# Patient Record
Sex: MALE | Employment: OTHER | URBAN - METROPOLITAN AREA
[De-identification: names, ages, dates, MRNs, and addresses within clinical notes are randomized per-mention and may not be internally consistent; named-entity substitution may affect disease eponyms.]

---

## 2017-01-18 ENCOUNTER — HOSPITAL ENCOUNTER (OUTPATIENT)
Dept: CT IMAGING | Age: 72
Discharge: HOME | End: 2017-01-18
Payer: SELF-PAY

## 2017-01-18 DIAGNOSIS — F17.200 SMOKER: ICD-10-CM

## 2017-01-18 PROCEDURE — G0297 LDCT FOR LUNG CA SCREEN: HCPCS

## 2018-02-27 ENCOUNTER — HOSPITAL ENCOUNTER (OUTPATIENT)
Dept: CT IMAGING | Age: 73
Discharge: HOME | End: 2018-02-27
Payer: SELF-PAY

## 2018-02-27 VITALS — HEIGHT: 71 IN | WEIGHT: 190 LBS | BODY MASS INDEX: 26.6 KG/M2

## 2018-02-27 DIAGNOSIS — Z87.891 PERSONAL HISTORY OF NICOTINE DEPENDENCE: ICD-10-CM

## 2018-02-27 PROCEDURE — G0297 LDCT FOR LUNG CA SCREEN: HCPCS

## 2018-04-24 ENCOUNTER — OFFICE VISIT (OUTPATIENT)
Dept: SURGERY | Age: 73
End: 2018-04-24

## 2018-04-24 VITALS
DIASTOLIC BLOOD PRESSURE: 82 MMHG | BODY MASS INDEX: 27.26 KG/M2 | SYSTOLIC BLOOD PRESSURE: 151 MMHG | HEIGHT: 71 IN | WEIGHT: 194.7 LBS | HEART RATE: 87 BPM

## 2018-04-24 DIAGNOSIS — Z86.010 HX OF COLONIC POLYP: Primary | ICD-10-CM

## 2018-04-24 RX ORDER — ATORVASTATIN CALCIUM 10 MG/1
10 TABLET, FILM COATED ORAL DAILY
COMMUNITY
Start: 2018-02-28

## 2018-04-24 RX ORDER — BISACODYL 5 MG
TABLET, DELAYED RELEASE (ENTERIC COATED) ORAL
Qty: 1 TAB | Refills: 0 | Status: SHIPPED | OUTPATIENT
Start: 2018-04-24 | End: 2018-05-24 | Stop reason: ALTCHOICE

## 2018-04-24 RX ORDER — VALSARTAN 160 MG/1
160 TABLET ORAL DAILY
COMMUNITY
Start: 2018-02-28 | End: 2020-02-10

## 2018-04-24 RX ORDER — SODIUM CHLORIDE, SODIUM LACTATE, POTASSIUM CHLORIDE, CALCIUM CHLORIDE 600; 310; 30; 20 MG/100ML; MG/100ML; MG/100ML; MG/100ML
200 INJECTION, SOLUTION INTRAVENOUS CONTINUOUS
Status: CANCELLED | OUTPATIENT
Start: 2018-04-24 | End: 2018-04-25

## 2018-04-24 RX ORDER — METOPROLOL SUCCINATE 200 MG/1
200 TABLET, EXTENDED RELEASE ORAL DAILY
COMMUNITY
Start: 2018-03-26

## 2018-04-24 RX ORDER — POLYETHYLENE GLYCOL 3350, SODIUM CHLORIDE, SODIUM BICARBONATE, POTASSIUM CHLORIDE 420; 11.2; 5.72; 1.48 G/4L; G/4L; G/4L; G/4L
POWDER, FOR SOLUTION ORAL
Qty: 1 BOTTLE | Refills: 0 | Status: SHIPPED | OUTPATIENT
Start: 2018-04-24 | End: 2018-05-24 | Stop reason: ALTCHOICE

## 2018-04-24 RX ORDER — ALLOPURINOL 300 MG/1
300 TABLET ORAL DAILY
COMMUNITY
Start: 2018-02-28

## 2018-04-24 NOTE — PROGRESS NOTES
[address] [facility], [facility]  Phone: [phone] Fax: [phone]                                              HISTORY AND PHYSICAL EXAM    NAME: Park Arthur  :     Chief Complaint:   History of colon cancer    History: Park Arthur is a 67 y.o.  male referred for 5 year colonoscopy. The patient has had multiple colonoscopies. The previous one was normal but one in the past showed polyps. The last colonoscopy was in . The bowel movements are regular and brown. He does not use any meds on a regular basis for his bowels. He denies any blood in stools or hemorrhoidal disease. Family history is negative for colon cancer or colon polyps. Past Medical History:   Diagnosis Date    Calculus of kidney     Hypercholesterolemia     Hypertension      Past Surgical History:   Procedure Laterality Date    HX COLONOSCOPY      HX HERNIA REPAIR          Current Outpatient Prescriptions   Medication Sig Dispense Refill    allopurinol (ZYLOPRIM) 300 mg tablet Take 300 mg by mouth daily.  atorvastatin (LIPITOR) 10 mg tablet Take 10 mg by mouth daily.  metoprolol succinate (TOPROL-XL) 50 mg XL tablet Take 50 mg by mouth daily.  valsartan (DIOVAN) 160 mg tablet Take 160 mg by mouth daily.  peg-electrolyte soln (GAVILYTE-N) 420 gram solution Take as directed  Indications: BOWEL EVACUATION 1 Bottle 0    bisacodyl (DULCOLAX, BISACODYL,) 5 mg EC tablet Take as directed  Indications: BOWEL EVACUATION 1 Tab 0     No Known Allergies  Social History     Social History    Marital status:      Spouse name: N/A    Number of children: N/A    Years of education: N/A     Occupational History    Not on file.      Social History Main Topics    Smoking status: Former Smoker     Packs/day: 1.00     Years: 30.00     Types: Cigarettes     Start date: 1972     Quit date: 2007    Smokeless tobacco: Never Used    Alcohol use Yes    Drug use: Not on file    Sexual activity: Not on file     Other Topics Concern    Not on file     Social History Narrative     Family History   Problem Relation Age of Onset    No Known Problems Mother        There is no problem list on file for this patient. Review of Systems:  Review of Systems   Constitutional: Negative for chills, fever, malaise/fatigue and weight loss. HENT: Negative for congestion, ear discharge, ear pain, hearing loss, nosebleeds, sore throat and tinnitus. Eyes: Negative for blurred vision, double vision, pain, discharge and redness. Respiratory: Negative for cough, sputum production, shortness of breath and wheezing. Cardiovascular: Negative for chest pain, palpitations and leg swelling. Gastrointestinal: Negative for abdominal pain, blood in stool, constipation, diarrhea, heartburn, melena, nausea and vomiting. Genitourinary: Positive for frequency. Negative for hematuria and urgency. Musculoskeletal: Negative for back pain, joint pain and neck pain. Skin: Negative for itching and rash. Neurological: Negative for dizziness, tremors, focal weakness, seizures, weakness and headaches. Endo/Heme/Allergies: Does not bruise/bleed easily. Psychiatric/Behavioral: Negative for depression and memory loss. The patient is not nervous/anxious and does not have insomnia. Physical Exam:  Visit Vitals    /82 (BP 1 Location: Left arm, BP Patient Position: Sitting)    Pulse 87    Ht 5' 11\" (1.803 m)    Wt 194 lb 11.2 oz (88.3 kg)    BMI 27.16 kg/m2       Physical Exam   Constitutional: He is oriented to person, place, and time. He appears well-developed and well-nourished. No distress. HENT:   Head: Normocephalic and atraumatic. Right Ear: External ear normal.   Left Ear: External ear normal.   Nose: Nose normal.   Mouth/Throat: Oropharynx is clear and moist. No oropharyngeal exudate. Eyes: EOM are normal. Pupils are equal, round, and reactive to light.  Right eye exhibits no discharge. Left eye exhibits no discharge. No scleral icterus. Neck: No tracheal deviation present. No thyromegaly present. Cardiovascular: Normal rate, regular rhythm and normal heart sounds. Exam reveals no friction rub. No murmur heard. Pulmonary/Chest: Effort normal and breath sounds normal. He has no wheezes. He has no rales. Abdominal: Soft. He exhibits no distension and no mass. There is no tenderness. Musculoskeletal: Normal range of motion. Lymphadenopathy:     He has no cervical adenopathy. Neurological: He is alert and oriented to person, place, and time. Skin: Skin is warm and dry. No rash noted. No erythema. Psychiatric: He has a normal mood and affect. His behavior is normal. Judgment and thought content normal.       Impression:  History of polyps    Plan:  Colonoscopy on 8/04/32  Risks and complications were explained to patient and they voiced understanding.     Olya Bhatti M.D.

## 2018-05-16 PROBLEM — K57.30 DIVERTICULA OF COLON: Status: ACTIVE | Noted: 2018-05-16

## 2018-05-16 PROBLEM — K63.5 COLON POLYP: Status: RESOLVED | Noted: 2018-05-16 | Resolved: 2018-05-16

## 2018-05-16 PROBLEM — K63.5 COLON POLYP: Status: ACTIVE | Noted: 2018-05-16

## 2018-05-16 LAB — COLONOSCOPY, EXTERNAL: NORMAL

## 2018-05-24 ENCOUNTER — OFFICE VISIT (OUTPATIENT)
Dept: SURGERY | Age: 73
End: 2018-05-24

## 2018-05-24 VITALS
WEIGHT: 194 LBS | DIASTOLIC BLOOD PRESSURE: 79 MMHG | BODY MASS INDEX: 27.16 KG/M2 | SYSTOLIC BLOOD PRESSURE: 136 MMHG | HEIGHT: 71 IN | HEART RATE: 65 BPM

## 2018-05-24 DIAGNOSIS — K57.30 DIVERTICULA OF COLON: ICD-10-CM

## 2018-05-24 DIAGNOSIS — D12.4 ADENOMATOUS POLYP OF DESCENDING COLON: Primary | ICD-10-CM

## 2018-05-24 NOTE — PROGRESS NOTES
Follow up from colonoscopy  No c/o      Pathology:  Adenomatous polyp x 1    Visit Vitals    /79 (BP 1 Location: Left arm, BP Patient Position: Sitting)    Pulse 65    Ht 5' 11\" (1.803 m)    Wt 194 lb (88 kg)    BMI 27.06 kg/m2     WDWN patient in no acute distress. Pathophysiology and malignancy potential discussed. Pictures viewed and questions answered. High fiber diet suggested. Time spent 15 minutes in discussion, over half the visit was in care coordination and patient counseling. Repeat scope in 5 years.

## 2020-02-10 ENCOUNTER — OFFICE VISIT (OUTPATIENT)
Dept: CARDIOLOGY CLINIC | Age: 75
End: 2020-02-10

## 2020-02-10 VITALS
HEIGHT: 71 IN | WEIGHT: 191 LBS | HEART RATE: 130 BPM | RESPIRATION RATE: 14 BRPM | OXYGEN SATURATION: 99 % | SYSTOLIC BLOOD PRESSURE: 142 MMHG | BODY MASS INDEX: 26.74 KG/M2 | DIASTOLIC BLOOD PRESSURE: 98 MMHG

## 2020-02-10 DIAGNOSIS — E11.9 CONTROLLED TYPE 2 DIABETES MELLITUS WITHOUT COMPLICATION, WITHOUT LONG-TERM CURRENT USE OF INSULIN (HCC): ICD-10-CM

## 2020-02-10 DIAGNOSIS — E78.00 HYPERCHOLESTEROLEMIA: ICD-10-CM

## 2020-02-10 DIAGNOSIS — R00.0 TACHYCARDIA: ICD-10-CM

## 2020-02-10 DIAGNOSIS — I48.0 PAF (PAROXYSMAL ATRIAL FIBRILLATION) (HCC): Primary | ICD-10-CM

## 2020-02-10 DIAGNOSIS — I10 ESSENTIAL HYPERTENSION: ICD-10-CM

## 2020-02-10 PROBLEM — K57.30 DIVERTICULA OF COLON: Status: RESOLVED | Noted: 2018-05-16 | Resolved: 2020-02-10

## 2020-02-10 RX ORDER — DILTIAZEM HYDROCHLORIDE 180 MG/1
180 CAPSULE, COATED, EXTENDED RELEASE ORAL DAILY
Qty: 90 CAP | Refills: 1 | Status: SHIPPED | OUTPATIENT
Start: 2020-02-10

## 2020-02-10 NOTE — PROGRESS NOTES
Verified patient with two patient identifiers. Medications reviewed/approved by Dr. Pepper Aguilar. 1. Have you been to the ER, urgent care clinic since your last visit? Hospitalized since your last visit? new pt    2. Have you seen or consulted any other health care providers outside of the [address] [place] [address] since your last visit? Include any pap smears or colon screening.  New pt

## 2020-02-10 NOTE — LETTER
2/10/20 Patient: Rena Steward YOB: 1945 Date of Visit: 2/10/2020 Jessica March MD 
[address] [facility] [place] [id number] VIA Facsimile: [phone] Dear Jessica March MD, Thank you for referring Mr. Marixa Fox to [facility] for evaluation. My notes for this consultation are attached. If you have questions, please do not hesitate to call me. I look forward to following your patient along with you.  
 
 
Sincerely, 
 
Eunice Lange MD

## 2020-02-10 NOTE — PROGRESS NOTES
Nadiya Prather is a 76 y.o. male is here for cardiac evaluation, referred by Dr. Tom Kapadia. Episodes of tachy since December--minimal to no sx but notices when checks pulse/BP. To ER in [facility] tachy, seen in f/u by Dr. Serjio Grimes with episodes afib/flutter, tachy and now on Metoprolol /d, started on Eliquis. Is in afib today, --no sx currently and unaware. Hx hypertension, dyslipidemia, DM II (diet). Former tobacco--quit 20 yrs ago, mod ETOH. No hx CAD, CHF, valvular, rheumatic, other. The patient denies chest pain/ shortness of breath, orthopnea, PND, LE edema, syncope, presyncope or fatigue.        Patient Active Problem List    Diagnosis Date Noted    Controlled type 2 diabetes mellitus without complication, without long-term current use of insulin ([facility] [address] 02/10/2020    PAF (paroxysmal atrial fibrillation) ([facility] [address]     Hypertension     Hypercholesterolemia       Army Silvestre MD  Past Medical History:   Diagnosis Date    Calculus of kidney     Hypercholesterolemia     Hypertension     PAF (paroxysmal atrial fibrillation) (HCC)       Past Surgical History:   Procedure Laterality Date    COLONOSCOPY N/A 2018    COLONOSCOPY performed by Duane Thurman MD at [facility] [address]27 HX COLONOSCOPY      HX COLONOSCOPY  2018    polyp, repeat in     HX HERNIA REPAIR       Allergies   Allergen Reactions    Codeine Rash      Family History   Problem Relation Age of Onset    Ovarian Cancer Mother     Heart Disease Father         CAD, s/p valve,  80    Liver Disease Father       Social History     Socioeconomic History    Marital status:      Spouse name: Not on file    Number of children: Not on file    Years of education: Not on file    Highest education level: Not on file   Occupational History    Not on file   Social Needs    Financial resource strain: Not on file    Food insecurity:     Worry: Not on file Inability: Not on file    Transportation needs:     Medical: Not on file     Non-medical: Not on file   Tobacco Use    Smoking status: Former Smoker     Packs/day: 1.00     Years: 30.00     Pack years: 30.00     Types: Cigarettes     Start date: 1972     Last attempt to quit: 2007     Years since quittin.9    Smokeless tobacco: Never Used   Substance and Sexual Activity    Alcohol use: Yes    Drug use: Not on file    Sexual activity: Not on file   Lifestyle    Physical activity:     Days per week: Not on file     Minutes per session: Not on file    Stress: Not on file   Relationships    Social connections:     Talks on phone: Not on file     Gets together: Not on file     Attends Pentecostal service: Not on file     Active member of club or organization: Not on file     Attends meetings of clubs or organizations: Not on file     Relationship status: Not on file    Intimate partner violence:     Fear of current or ex partner: Not on file     Emotionally abused: Not on file     Physically abused: Not on file     Forced sexual activity: Not on file   Other Topics Concern    Not on file   Social History Narrative    Not on file      Current Outpatient Medications   Medication Sig    apixaban (ELIQUIS) 5 mg tablet Take 5 mg by mouth two (2) times a day.  dilTIAZem CD (CARDIZEM CD) 180 mg ER capsule Take 1 Cap by mouth daily.  losartan (COZAAR) 100 mg tablet Take 100 mg by mouth two (2) times a day.  aspirin-calcium carbonate 81 mg-300 mg calcium(777 mg) tab Take 81 mg by mouth.  allopurinol (ZYLOPRIM) 300 mg tablet Take 300 mg by mouth daily.  atorvastatin (LIPITOR) 10 mg tablet Take 10 mg by mouth daily.  metoprolol succinate (TOPROL-XL) 200 mg XL tablet Take 200 mg by mouth daily. No current facility-administered medications for this visit. Review of Symptoms:    CONST  No weight change.  No fever, chills, sweats    ENT No visual changes, URI sx, sore throat    CV See HPI   RESP  No cough, or sputum, wheezing. Also see HPI   GI  No abdominal pain or change in bowel habits. No heartburn or dysphagia. No melena or rectal bleeding.   No dysuria, urgency, frequency, hematuria   MSKEL  No joint pain, swelling. No muscle pain. SKIN  No rash or lesions. NEURO  No headache, syncope, or seizure. No weakness, loss of sensation, or paresthesias. PSYCH  No low mood or depression  No anxiety. HE/LYMPH  No easy bruising, abnormal bleeding, or enlarged glands. Physical ExamPhysical Exam:    Visit Vitals  BP (!) 142/98 (BP 1 Location: Left arm, BP Patient Position: Sitting)   Pulse (!) 130   Resp 14   Ht 5' 11\" (1.803 m)   Wt 191 lb (86.6 kg)   SpO2 99% Comment: ra   BMI 26.64 kg/m²     Gen: NAD  HEENT:  PERRL, throat clear  Neck: no adenopathy, no thyromegaly, no JVD   Heart:  irregular,Nl S1S2,  no murmur, gallop or rub.   Lungs:  clear  Abdomen:   Soft, non-tender, bowel sounds are active.   Extremities:  No edema  Pulse: symmetric  Neuro: A&O times 3, No focal neuro deficits    Cardiographics    ECG: afib, , RBBB    Labs:   Lab Results   Component Value Date/Time    Sodium 140 12/08/2019 11:02 AM    Potassium 4.1 12/08/2019 11:02 AM    Chloride 103 12/08/2019 11:02 AM    CO2 27 12/08/2019 11:02 AM    Anion gap 10 12/08/2019 11:02 AM    Glucose 127 (H) 12/08/2019 11:02 AM    BUN 20 12/08/2019 11:02 AM    Creatinine 1.09 12/08/2019 11:02 AM    BUN/Creatinine ratio 18 12/08/2019 11:02 AM    GFR est AA >60 12/08/2019 11:02 AM    GFR est non-AA >60 12/08/2019 11:02 AM    Calcium 10.2 (H) 12/08/2019 11:02 AM    Bilirubin, total 0.8 12/08/2019 11:02 AM    AST (SGOT) 33 12/08/2019 11:02 AM    Alk.  phosphatase 59 12/08/2019 11:02 AM    Protein, total 7.3 12/08/2019 11:02 AM    Albumin 4.2 12/08/2019 11:02 AM    Globulin 3.1 12/08/2019 11:02 AM    A-G Ratio 1.4 12/08/2019 11:02 AM    ALT (SGPT) 45 12/08/2019 11:02 AM     No results found for: CPK, CPKX, CPX  No results found for: CHOL, CHOLX, CHLST, CHOLV, 402761, HDL, HDLP, LDL, LDLC, DLDLP, TGLX, TRIGL, TRIGP, CHHD, CHHDX  No results found for this or any previous visit. Assessment:         Patient Active Problem List    Diagnosis Date Noted    Controlled type 2 diabetes mellitus without complication, without long-term current use of insulin ([facility] [address] [facility] 02/10/2020    PAF (paroxysmal atrial fibrillation) ([facility]     Hypertension     Hypercholesterolemia      76 y.o. male is here for cardiac evaluation, referred by Dr. Darinel Mcnally. Episodes of tachy since December--minimal to no sx but notices when checks pulse/BP. To ER in [facility] tachy, seen in f/u by Dr. Carol Ann Ireland with episodes afib/flutter, tachy and now on Metoprolol /d, started on Eliquis. Is in afib today, --no sx currently and unaware. Hx hypertension, dyslipidemia, DM II (diet). Former tobacco--quit 20 yrs ago, mod ETOH.  No hx CAD, CHF, valvular, rheumatic, other     Plan:     ADD Cardizem   Continue Metoprolol  + Eliquis (CHADs2-VASC 3)  Echo/doppler  Lexiscan MPI--stay on meds  Discussed afib at length with pt, treatment options (incl ablation, DCC/AAD, rate control/anticoag)--minimal to no sx currently  Outside notes and Holter reviewed, will obtain recent labs from PCP    Coleman Levi MD

## 2020-02-10 NOTE — PATIENT INSTRUCTIONS
Please  the Cardizem as soon as you can and take ONE capsule. Starting tomorrow take metoprolol in the morning and Cardizem in the evening.

## 2020-02-14 ENCOUNTER — TELEPHONE (OUTPATIENT)
Dept: CARDIOLOGY CLINIC | Age: 75
End: 2020-02-14

## 2020-02-14 NOTE — TELEPHONE ENCOUNTER
----- Message from Sabina Zamarripa MD sent at 2/11/2020  2:03 PM EST -----  Regarding: Gavino Lynch MPI  Call/advise stress nuclear scan looks normal, no blockages or ischemia, normal LV pumping function. Echo pending. Thanks [employer] [place]    Spoke with the patient. Verified patient with two patient identifiers. Results given and questions answered. Patient verbalized understanding.

## 2020-02-26 ENCOUNTER — TELEPHONE (OUTPATIENT)
Dept: CARDIOLOGY CLINIC | Age: 75
End: 2020-02-26

## 2020-02-26 NOTE — TELEPHONE ENCOUNTER
Per Dr. Jeremías Landry: Advise echo shows normal LV pumping function, only mild valve abnormalities--no concerns. F/u 6 mo.  [facility]     Spoke with the patient. Verified patient with two patient identifiers. Results given and questions answered. Patient verbalized understanding. Pt wants to call back regarding follow up.

## 2020-06-17 ENCOUNTER — OFFICE VISIT (OUTPATIENT)
Dept: SURGERY | Age: 75
End: 2020-06-17

## 2020-06-17 VITALS
SYSTOLIC BLOOD PRESSURE: 149 MMHG | WEIGHT: 190 LBS | TEMPERATURE: 98.9 F | HEIGHT: 71 IN | DIASTOLIC BLOOD PRESSURE: 84 MMHG | HEART RATE: 98 BPM | BODY MASS INDEX: 26.6 KG/M2

## 2020-06-17 DIAGNOSIS — S81.811D: Primary | ICD-10-CM

## 2020-06-17 NOTE — PROGRESS NOTES
Guille Montez. is a 76 y.o. male who presents today with the following:  Chief Complaint   Patient presents with    Suture Removal       HPI    66-year-old male who presents to us as a referral by Dr. Rikki Barraza for a right lower leg wound. He sustained a laceration while getting off of the boat on June 5 which was 12 days ago. He was seen in the emergency department and had the laceration repaired by the ED physician. Documentation shows that the laceration extended down to the fascia and measured approximately 30 cm. This was closed with a combination of mattress sutures and running sutures. The patient states that it is remained sore and he has been on antibiotics but he has not had any fevers. He has had about the same amount of redness and drainage from the time that it was last assessed about 5 days ago. He certainly does not feel like it is getting any worse.       Past Medical History:   Diagnosis Date    Calculus of kidney     Hypercholesterolemia     Hypertension     PAF (paroxysmal atrial fibrillation) (HCC)        Past Surgical History:   Procedure Laterality Date    COLONOSCOPY N/A 5/16/2018    COLONOSCOPY performed by Gisell Mcnally MD at [facility]27 HX COLONOSCOPY  2013    HX COLONOSCOPY  2018    polyp, repeat in 2023    HX HERNIA REPAIR         Social History     Socioeconomic History    Marital status:      Spouse name: Not on file    Number of children: Not on file    Years of education: Not on file    Highest education level: Not on file   Occupational History    Not on file   Social Needs    Financial resource strain: Not on file    Food insecurity     Worry: Not on file     Inability: Not on file    Transportation needs     Medical: Not on file     Non-medical: Not on file   Tobacco Use    Smoking status: Former Smoker     Packs/day: 1.00     Years: 30.00     Pack years: 30.00     Types: Cigarettes     Start date: 2/27/1972     Last attempt to quit: 2007     Years since quittin.3    Smokeless tobacco: Never Used   Substance and Sexual Activity    Alcohol use: Yes    Drug use: Not on file    Sexual activity: Not on file   Lifestyle    Physical activity     Days per week: Not on file     Minutes per session: Not on file    Stress: Not on file   Relationships    Social connections     Talks on phone: Not on file     Gets together: Not on file     Attends [employer] service: Not on file     Active member of club or organization: Not on file     Attends meetings of clubs or organizations: Not on file     Relationship status: Not on file    Intimate partner violence     Fear of current or ex partner: Not on file     Emotionally abused: Not on file     Physically abused: Not on file     Forced sexual activity: Not on file   Other Topics Concern    Not on file   Social History Narrative    Not on file       Family History   Problem Relation Age of Onset    Ovarian Cancer Mother     Heart Disease Father         CAD, s/p valve,  80   [facility] Liver Disease Father        Allergies   Allergen Reactions    Codeine Rash       Current Outpatient Medications   Medication Sig    apixaban (ELIQUIS) 5 mg tablet Take 5 mg by mouth two (2) times a day.  dilTIAZem CD (CARDIZEM CD) 180 mg ER capsule Take 1 Cap by mouth daily.  losartan (COZAAR) 100 mg tablet Take 100 mg by mouth two (2) times a day.  aspirin-calcium carbonate 81 mg-300 mg calcium(777 mg) tab Take 81 mg by mouth.  allopurinol (ZYLOPRIM) 300 mg tablet Take 300 mg by mouth daily.  atorvastatin (LIPITOR) 10 mg tablet Take 10 mg by mouth daily.  metoprolol succinate (TOPROL-XL) 200 mg XL tablet Take 200 mg by mouth daily. No current facility-administered medications for this visit. The above histories, medications and allergies have been reviewed.     ROS    Visit Vitals  /84 (BP 1 Location: Left arm, BP Patient Position: Sitting)   Pulse 98   Temp 98.9 °F (37.2 °C) (Temporal)   Ht 5' 11\" (1.803 m)   Wt 190 lb (86.2 kg)   BMI 26.50 kg/m²     Physical Exam  Musculoskeletal:      Comments: On examination of the right lower leg he has an anterior tibial laceration that measures approximately 17 cm within extension medially that measures and it actually 4 to 5 cm. There is some separation from the wound edges but is hard to ascertain if this is full-thickness or just superficial.  He has some superficial desquamation. There is some edema that is present in both bilateral lower extremities and some mild induration and mild erythema around his incision. There is no purulence. He is tender. Sutures are removed. The area is cleansed with peroxide and antibiotic ointment and a Telfa dressing followed by Kerlix is applied. 1. Laceration of right lower limb, subsequent encounter  Sutures removed. He is told to complete his course of antibiotics. We have instructed him on wound care. I would like to see him back in 5 days or sooner if he has any problems. He is told that if he develops any purulence or increased erythema he needs to call or go to the emergency department. I have also explained to him that I anticipate that he will have at least some separation of his wound edges. We will reassess this on the follow-up visit. Follow-up and Dispositions    · Return in about 5 days (around 6/22/2020) for recheck.          Shree Moran MD

## 2020-06-17 NOTE — PATIENT INSTRUCTIONS

## 2020-06-17 NOTE — PROGRESS NOTES
1. Have you been to the ER, urgent care clinic since your last visit? Hospitalized since your last visit? Yes When: 6/5/20, guillermo, [place]    2. Have you seen or consulted any other health care providers outside of the [address] [place] [address] since your last visit? Include any pap smears or colon screening.  No

## 2020-06-22 ENCOUNTER — OFFICE VISIT (OUTPATIENT)
Dept: SURGERY | Age: 75
End: 2020-06-22

## 2020-06-22 VITALS
SYSTOLIC BLOOD PRESSURE: 173 MMHG | WEIGHT: 190 LBS | DIASTOLIC BLOOD PRESSURE: 100 MMHG | HEART RATE: 102 BPM | HEIGHT: 71 IN | BODY MASS INDEX: 26.6 KG/M2 | TEMPERATURE: 98.4 F

## 2020-06-22 DIAGNOSIS — S81.811D: Primary | ICD-10-CM

## 2020-06-22 NOTE — PROGRESS NOTES
Pratima Franks is a 76 y.o. male who presents today with the following:  Chief Complaint   Patient presents with    Wound Check       HPI    Presents for reevaluation of his right lower leg wound. He feels like he is doing better. He states that the redness is decreased as has the pain. He also believes the swelling has decreased. Past Medical History:   Diagnosis Date    Calculus of kidney     Hypercholesterolemia     Hypertension     PAF (paroxysmal atrial fibrillation) (HCC)        Past Surgical History:   Procedure Laterality Date    COLONOSCOPY N/A 2018    COLONOSCOPY performed by Kate Urrutia MD at [facility] 1827 HX COLONOSCOPY      HX COLONOSCOPY  2018    polyp, repeat in     HX HERNIA REPAIR         Social History     Socioeconomic History    Marital status:      Spouse name: Not on file    Number of children: Not on file    Years of education: Not on file    Highest education level: Not on file   Occupational History    Not on file   Social Needs    Financial resource strain: Not on file    Food insecurity     Worry: Not on file     Inability: Not on file    Transportation needs     Medical: Not on file     Non-medical: Not on file   Tobacco Use    Smoking status: Former Smoker     Packs/day: 1.00     Years: 30.00     Pack years: 30.00     Types: Cigarettes     Start date: 1972     Last attempt to quit: 2007     Years since quittin.3    Smokeless tobacco: Never Used   Substance and Sexual Activity    Alcohol use:  Yes    Drug use: Not on file    Sexual activity: Not on file   Lifestyle    Physical activity     Days per week: Not on file     Minutes per session: Not on file    Stress: Not on file   Relationships    Social connections     Talks on phone: Not on file     Gets together: Not on file     Attends Hoahaoism service: Not on file     Active member of club or organization: Not on file     Attends meetings of clubs or organizations: Not on file     Relationship status: Not on file    Intimate partner violence     Fear of current or ex partner: Not on file     Emotionally abused: Not on file     Physically abused: Not on file     Forced sexual activity: Not on file   Other Topics Concern    Not on file   Social History Narrative    Not on file       Family History   Problem Relation Age of Onset    Ovarian Cancer Mother     Heart Disease Father         CAD, s/p valve,  80   [place] Liver Disease Father        Allergies   Allergen Reactions    Codeine Rash       Current Outpatient Medications   Medication Sig    apixaban (ELIQUIS) 5 mg tablet Take 5 mg by mouth two (2) times a day.  dilTIAZem CD (CARDIZEM CD) 180 mg ER capsule Take 1 Cap by mouth daily.  losartan (COZAAR) 100 mg tablet Take 100 mg by mouth two (2) times a day.  aspirin-calcium carbonate 81 mg-300 mg calcium(777 mg) tab Take 81 mg by mouth.  allopurinol (ZYLOPRIM) 300 mg tablet Take 300 mg by mouth daily.  atorvastatin (LIPITOR) 10 mg tablet Take 10 mg by mouth daily.  metoprolol succinate (TOPROL-XL) 200 mg XL tablet Take 200 mg by mouth daily. No current facility-administered medications for this visit. The above histories, medications and allergies have been reviewed. ROS    Visit Vitals  BP (!) 173/100 (BP 1 Location: Left arm, BP Patient Position: Sitting)   Pulse (!) 102   Temp 98.4 °F (36.9 °C)   Ht 5' 11\" (1.803 m)   Wt 190 lb (86.2 kg)   BMI 26.50 kg/m²     Physical Exam  Musculoskeletal:      Comments: Right lower extremity incision with decreased erythema and edema. There are small pieces of necrotic tissue near the distalmost aspect of the incision that are sharply debrided. No purulence. 1. Laceration of right lower limb, subsequent encounter  Overall showing improvement. Necrotic tissue debrided today in office. Continue local wound care and topical antibiotics.   Follow-up in 1 week for recheck or sooner if any problems.           Roslyn Anand MD

## 2020-06-22 NOTE — PATIENT INSTRUCTIONS
Cuts Left Open: Care Instructions Your Care Instructions A cut can happen anywhere on your body. Sometimes a cut can injure the tendons, blood vessels, or nerves. A cut may be left open instead of being closed with stitches, staples, or adhesive. A cut may be left open when it is likely to become infected, because closing it can make infection even more likely. You will probably have a bandage. The doctor may want the cut to stay open the whole time it heals. This happens with some cuts when too much time has gone by since the cut happened. Or the doctor may tell you to come back to have the cut closed in 4 to 5 days, when there is less chance of infection. If the cut stays open while healing, your scar may be larger than if the cut was closed. But you can get treatment later to make the scar smaller. The doctor has checked you carefully, but problems can develop later. If you notice any problems or new symptoms, get medical treatment right away. Follow-up care is a key part of your treatment and safety. Be sure to make and go to all appointments, and call your doctor if you are having problems. It's also a good idea to know your test results and keep a list of the medicines you take. How can you care for yourself at home? · Keep the cut dry for the first 24 to 48 hours. After this, you can shower if your doctor okays it. Pat the cut dry. · Don't soak the cut, such as in a bathtub. Your doctor will tell you when it's safe to get the cut wet. · If your doctor told you how to care for your cut, follow your doctor's instructions. If you did not get instructions, follow this general advice: ? After the first 24 to 48 hours, wash the cut with clean water 2 times a day. Don't use hydrogen peroxide or alcohol, which can slow healing. ? You may cover the cut with a thin layer of petroleum jelly, such as Vaseline, and a nonstick bandage. ? Apply more petroleum jelly and replace the bandage as needed. · Prop up the injured area on a pillow anytime you sit or lie down during the next 3 days. Try to keep it above the level of your heart. This will help reduce swelling. · Avoid any activity that could cause your cut to get worse. · Take pain medicines exactly as directed. ? If the doctor gave you a prescription medicine for pain, take it as prescribed. ? If you are not taking a prescription pain medicine, ask your doctor if you can take an over-the-counter medicine. When should you call for help? Call your doctor now or seek immediate medical care if: 
· You have new pain, or your pain gets worse. · The cut starts to bleed, and blood soaks through the bandage. Oozing small amounts of blood is normal. 
· The skin near the cut is cold or pale or changes color. · You have tingling, weakness, or numbness near the cut. · You have trouble moving the area near the cut. · You have symptoms of infection, such as: 
? Increased pain, swelling, warmth, or redness around the cut. 
? Red streaks leading from the cut. 
? Pus draining from the cut. 
? A fever. Watch closely for changes in your health, and be sure to contact your doctor if: · The cut is not closing (getting smaller). · You do not get better as expected. Where can you learn more? Go to http://cristofer-milo.info/ Enter 20-23-41-52 in the search box to learn more about \"Cuts Left Open: Care Instructions. \" Current as of: June 26, 2019               Content Version: 12.5 © 4752-7558 Healthwise, Incorporated. Care instructions adapted under license by [employer] (which disclaims liability or warranty for this information). If you have questions about a medical condition or this instruction, always ask your healthcare professional. [place] [address] any warranty or liability for your use of this information.

## 2020-06-29 ENCOUNTER — OFFICE VISIT (OUTPATIENT)
Dept: SURGERY | Age: 75
End: 2020-06-29

## 2020-06-29 VITALS
SYSTOLIC BLOOD PRESSURE: 153 MMHG | BODY MASS INDEX: 26.6 KG/M2 | TEMPERATURE: 99.3 F | DIASTOLIC BLOOD PRESSURE: 93 MMHG | WEIGHT: 190 LBS | HEIGHT: 71 IN | HEART RATE: 125 BPM

## 2020-06-29 DIAGNOSIS — S81.811D: Primary | ICD-10-CM

## 2020-06-29 NOTE — PROGRESS NOTES
Pratima Franks is a 76 y.o. male who presents today with the following:  No chief complaint on file. HPI    He has been doing well. He has had no new problems since last seen. He states that the pain continues to improve. Past Medical History:   Diagnosis Date    Calculus of kidney     Hypercholesterolemia     Hypertension     PAF (paroxysmal atrial fibrillation) (HCC)        Past Surgical History:   Procedure Laterality Date    COLONOSCOPY N/A 2018    COLONOSCOPY performed by [facility] at [facility] 1827 HX COLONOSCOPY      HX COLONOSCOPY  2018    polyp, repeat in     HX HERNIA REPAIR         Social History     Socioeconomic History    Marital status:      Spouse name: Not on file    Number of children: Not on file    Years of education: Not on file    Highest education level: Not on file   Occupational History    Not on file   Social Needs    Financial resource strain: Not on file    Food insecurity     Worry: Not on file     Inability: Not on file    Transportation needs     Medical: Not on file     Non-medical: Not on file   Tobacco Use    Smoking status: Former Smoker     Packs/day: 1.00     Years: 30.00     Pack years: 30.00     Types: Cigarettes     Start date: 1972     Last attempt to quit: 2007     Years since quittin.3    Smokeless tobacco: Never Used   Substance and Sexual Activity    Alcohol use:  Yes    Drug use: Not on file    Sexual activity: Not on file   Lifestyle    Physical activity     Days per week: Not on file     Minutes per session: Not on file    Stress: Not on file   Relationships    Social connections     Talks on phone: Not on file     Gets together: Not on file     Attends Yazidism service: Not on file     Active member of club or organization: Not on file     Attends meetings of clubs or organizations: Not on file     Relationship status: Not on file    Intimate partner violence     Fear of current or ex partner: Not on file     Emotionally abused: Not on file     Physically abused: Not on file     Forced sexual activity: Not on file   Other Topics Concern    Not on file   Social History Narrative    Not on file       Family History   Problem Relation Age of Onset    Ovarian Cancer Mother     Heart Disease Father         CAD, s/p valve,  80   [place] Liver Disease Father        Allergies   Allergen Reactions    Codeine Rash       Current Outpatient Medications   Medication Sig    apixaban (ELIQUIS) 5 mg tablet Take 5 mg by mouth two (2) times a day.  dilTIAZem CD (CARDIZEM CD) 180 mg ER capsule Take 1 Cap by mouth daily.  losartan (COZAAR) 100 mg tablet Take 100 mg by mouth two (2) times a day.  aspirin-calcium carbonate 81 mg-300 mg calcium(777 mg) tab Take 81 mg by mouth.  allopurinol (ZYLOPRIM) 300 mg tablet Take 300 mg by mouth daily.  atorvastatin (LIPITOR) 10 mg tablet Take 10 mg by mouth daily.  metoprolol succinate (TOPROL-XL) 200 mg XL tablet Take 200 mg by mouth daily. No current facility-administered medications for this visit. The above histories, medications and allergies have been reviewed. ROS    Visit Vitals  BP (!) 153/93 (BP 1 Location: Left arm, BP Patient Position: Sitting)   Pulse (!) 125   Temp 99.3 °F (37.4 °C) (Temporal)   Ht 5' 11\" (1.803 m)   Wt 190 lb (86.2 kg)   BMI 26.50 kg/m²     Physical Exam  Musculoskeletal:      Comments: Right lower extremity wound with decreased erythema. He still has some fibrinous exudate that is debrided today in the office but overall the wound shows improvement. 1. Laceration of right lower limb, subsequent encounter  Continuing improvement in healing by secondary intention to the lowermost aspect of the wound. Continue local wound care. Follow-up in approximately 2 weeks. Follow-up and Dispositions    · Return in about 2 weeks (around 2020) for recheck.          Cr Yung MD

## 2020-06-29 NOTE — PATIENT INSTRUCTIONS
Wound Check: Care Instructions Your Care Instructions People have wounds that need care for many reasons. You may have a cut that needs care after surgery. You may have a cut or puncture wound from an accident. Or you may have a wound because of a condition like diabetes. Whatever the cause of your wound, there are things you can do to care for it at home. Your doctor may also want you to come back for a wound check. The wound check lets the doctor know how your wound is healing and if you need more treatment. Follow-up care is a key part of your treatment and safety. Be sure to make and go to all appointments, and call your doctor if you are having problems. It's also a good idea to know your test results and keep a list of the medicines you take. How can you care for yourself at home? · If your doctor told you how to care for your wound, follow your doctor's instructions. If you did not get instructions, follow this general advice: ? You may cover the wound with a thin layer of petroleum jelly, such as Vaseline, and a nonstick bandage. ? Apply more petroleum jelly and replace the bandage as needed. · Keep the wound dry for the first 24 to 48 hours. After this, you can shower if your doctor okays it. Pat the wound dry. · Be safe with medicines. Read and follow all instructions on the label. ? If the doctor gave you a prescription medicine for pain, take it as prescribed. ? If you are not taking a prescription pain medicine, ask your doctor if you can take an over-the-counter medicine. · If your doctor prescribed antibiotics, take them as directed. Do not stop taking them just because you feel better. You need to take the full course of antibiotics. · If you have stitches, do not remove them on your own. Your doctor will tell you when to come back to have them removed. · If you have Steri-Strips, leave them on until they fall off. · If possible, prop up the injured area on a pillow anytime you sit or lie down during the next 3 days. Try to keep it above the level of your heart. This will help reduce swelling. When should you call for help? Call your doctor now or seek immediate medical care if: 
· You have new pain, or the pain gets worse. · The skin near the wound is cold or pale or changes color. · You have tingling, weakness, or numbness near the wound. · The wound starts to bleed, and blood soaks through the bandage. Oozing small amounts of blood is normal. 
· You have symptoms of infection, such as: 
? Increased pain, swelling, warmth, or redness. ? Red streaks leading from the wound. ? Pus draining from the wound. ? A fever. Watch closely for changes in your health, and be sure to contact your doctor if: 
· You do not get better as expected. Where can you learn more? Go to http://cristofer-milo.info/ Enter P342 in the search box to learn more about \"Wound Check: Care Instructions. \" Current as of: June 26, 2019               Content Version: 12.5 © 4943-4411 Healthwise, Incorporated. Care instructions adapted under license by [employer] (which disclaims liability or warranty for this information). If you have questions about a medical condition or this instruction, always ask your healthcare professional. [address] any warranty or liability for your use of this information.

## 2020-07-13 ENCOUNTER — OFFICE VISIT (OUTPATIENT)
Dept: SURGERY | Age: 75
End: 2020-07-13

## 2020-07-13 VITALS
HEART RATE: 80 BPM | DIASTOLIC BLOOD PRESSURE: 72 MMHG | HEIGHT: 71 IN | SYSTOLIC BLOOD PRESSURE: 153 MMHG | TEMPERATURE: 97.1 F | BODY MASS INDEX: 26.6 KG/M2 | WEIGHT: 190 LBS

## 2020-07-13 DIAGNOSIS — S81.811D: Primary | ICD-10-CM

## 2020-07-13 NOTE — PROGRESS NOTES
Bo Paniagua. is a 76 y.o. male who presents today with the following:  Chief Complaint   Patient presents with    Wound Check       HPI    He has been doing well. He has had no new problems since last seen. He feels like the leg is draining less. Past Medical History:   Diagnosis Date    Calculus of kidney     Hypercholesterolemia     Hypertension     PAF (paroxysmal atrial fibrillation) (HCC)        Past Surgical History:   Procedure Laterality Date    COLONOSCOPY N/A 2018    COLONOSCOPY performed by Rosanna Alfonso MD at [facility] HX COLONOSCOPY      HX COLONOSCOPY  2018    polyp, repeat in     HX HERNIA REPAIR         Social History     Socioeconomic History    Marital status:      Spouse name: Not on file    Number of children: Not on file    Years of education: Not on file    Highest education level: Not on file   Occupational History    Not on file   Social Needs    Financial resource strain: Not on file    Food insecurity     Worry: Not on file     Inability: Not on file    Transportation needs     Medical: Not on file     Non-medical: Not on file   Tobacco Use    Smoking status: Former Smoker     Packs/day: 1.00     Years: 30.00     Pack years: 30.00     Types: Cigarettes     Start date: 1972     Last attempt to quit: 2007     Years since quittin.3    Smokeless tobacco: Never Used   Substance and Sexual Activity    Alcohol use:  Yes    Drug use: Not on file    Sexual activity: Not on file   Lifestyle    Physical activity     Days per week: Not on file     Minutes per session: Not on file    Stress: Not on file   Relationships    Social connections     Talks on phone: Not on file     Gets together: Not on file     Attends Restoration service: Not on file     Active member of club or organization: Not on file     Attends meetings of clubs or organizations: Not on file     Relationship status: Not on file    Intimate partner violence Fear of current or ex partner: Not on file     Emotionally abused: Not on file     Physically abused: Not on file     Forced sexual activity: Not on file   Other Topics Concern    Not on file   Social History Narrative    Not on file       Family History   Problem Relation Age of Onset    Ovarian Cancer Mother     Heart Disease Father         CAD, s/p valve,  80   [place] Liver Disease Father        Allergies   Allergen Reactions    Codeine Rash       Current Outpatient Medications   Medication Sig    apixaban (ELIQUIS) 5 mg tablet Take 5 mg by mouth two (2) times a day.  dilTIAZem CD (CARDIZEM CD) 180 mg ER capsule Take 1 Cap by mouth daily.  losartan (COZAAR) 100 mg tablet Take 100 mg by mouth two (2) times a day.  aspirin-calcium carbonate 81 mg-300 mg calcium(777 mg) tab Take 81 mg by mouth.  allopurinol (ZYLOPRIM) 300 mg tablet Take 300 mg by mouth daily.  atorvastatin (LIPITOR) 10 mg tablet Take 10 mg by mouth daily.  metoprolol succinate (TOPROL-XL) 200 mg XL tablet Take 200 mg by mouth daily. No current facility-administered medications for this visit. The above histories, medications and allergies have been reviewed. ROS    Visit Vitals  /72 (BP 1 Location: Left arm, BP Patient Position: Sitting)   Pulse 80   Temp 97.1 °F (36.2 °C) (Temporal)   Ht 5' 11\" (1.803 m)   Wt 190 lb (86.2 kg)   BMI 26.50 kg/m²     Physical Exam  Skin:     Comments: Right lower leg showing marked improvement in the amount of drainage. There is no surrounding erythema. The wound appears to be healing nicely by secondary intention. Photograph was taken. 1. Laceration of right lower limb, subsequent encounter  Continue to keep the wound closed and allowed to heal by secondary intention. I like to see him back in approximately 1 month or sooner if he has any problems. Making nice progress.       Follow-up and Dispositions    · Return in about 4 weeks (around 8/10/2020) for pramod.          Tawnya Hernandez MD

## 2020-11-20 ENCOUNTER — OFFICE VISIT (OUTPATIENT)
Dept: CARDIOLOGY CLINIC | Age: 75
End: 2020-11-20
Payer: MEDICARE

## 2020-11-20 VITALS
HEIGHT: 71 IN | RESPIRATION RATE: 16 BRPM | OXYGEN SATURATION: 99 % | DIASTOLIC BLOOD PRESSURE: 100 MMHG | SYSTOLIC BLOOD PRESSURE: 142 MMHG | TEMPERATURE: 97 F | BODY MASS INDEX: 26.74 KG/M2 | WEIGHT: 191 LBS | HEART RATE: 80 BPM

## 2020-11-20 DIAGNOSIS — I48.0 PAF (PAROXYSMAL ATRIAL FIBRILLATION) (HCC): Primary | ICD-10-CM

## 2020-11-20 DIAGNOSIS — I10 ESSENTIAL HYPERTENSION: ICD-10-CM

## 2020-11-20 DIAGNOSIS — E78.00 HYPERCHOLESTEROLEMIA: ICD-10-CM

## 2020-11-20 DIAGNOSIS — E11.9 CONTROLLED TYPE 2 DIABETES MELLITUS WITHOUT COMPLICATION, WITHOUT LONG-TERM CURRENT USE OF INSULIN (HCC): ICD-10-CM

## 2020-11-20 PROBLEM — S81.811D: Status: RESOLVED | Noted: 2020-06-17 | Resolved: 2020-11-20

## 2020-11-20 PROCEDURE — 3017F COLORECTAL CA SCREEN DOC REV: CPT | Performed by: INTERNAL MEDICINE

## 2020-11-20 PROCEDURE — 99214 OFFICE O/P EST MOD 30 MIN: CPT | Performed by: INTERNAL MEDICINE

## 2020-11-20 PROCEDURE — 1101F PT FALLS ASSESS-DOCD LE1/YR: CPT | Performed by: INTERNAL MEDICINE

## 2020-11-20 PROCEDURE — G8753 SYS BP > OR = 140: HCPCS | Performed by: INTERNAL MEDICINE

## 2020-11-20 PROCEDURE — G8427 DOCREV CUR MEDS BY ELIG CLIN: HCPCS | Performed by: INTERNAL MEDICINE

## 2020-11-20 PROCEDURE — 3046F HEMOGLOBIN A1C LEVEL >9.0%: CPT | Performed by: INTERNAL MEDICINE

## 2020-11-20 PROCEDURE — G8419 CALC BMI OUT NRM PARAM NOF/U: HCPCS | Performed by: INTERNAL MEDICINE

## 2020-11-20 PROCEDURE — G8432 DEP SCR NOT DOC, RNG: HCPCS | Performed by: INTERNAL MEDICINE

## 2020-11-20 PROCEDURE — 2022F DILAT RTA XM EVC RTNOPTHY: CPT | Performed by: INTERNAL MEDICINE

## 2020-11-20 PROCEDURE — 93000 ELECTROCARDIOGRAM COMPLETE: CPT | Performed by: INTERNAL MEDICINE

## 2020-11-20 PROCEDURE — G8755 DIAS BP > OR = 90: HCPCS | Performed by: INTERNAL MEDICINE

## 2020-11-20 PROCEDURE — G8536 NO DOC ELDER MAL SCRN: HCPCS | Performed by: INTERNAL MEDICINE

## 2020-11-20 NOTE — LETTER
11/20/20 Patient: Ceci Soler. YOB: 1945 Date of Visit: 11/20/2020 Echo Parmar MD 
[address] [facility] [place] [address] [id number] VIA Facsimile: [phone] Dear Echo Parmar MD, Thank you for referring Mr. Subha Lucas to [facility] for evaluation. My notes for this consultation are attached. If you have questions, please do not hesitate to call me. I look forward to following your patient along with you.  
 
 
Sincerely, 
 
Apple Craft MD

## 2020-11-20 NOTE — PROGRESS NOTES
Verified patient with two patient identifiers. Medications reviewed/approved by Dr. Kelly De La O. Chief Complaint   Patient presents with    Irregular Heart Beat     6 MONTH FOLLOW UP    Hypertension     1. Have you been to the ER, urgent care clinic since your last visit? Hospitalized since your last visit? NO  2. Have you seen or consulted any other health care providers outside of the [address] [place] [address] since your last visit? Include any pap smears or colon screening. YES, PCP Dr Ami Etienne L/S 6 WEEKS AGO - for r/c.

## 2020-11-20 NOTE — PROGRESS NOTES
Tiffani Dhaliwal. is a 76 y.o. male is here for routine f/u, seen here for cardiac evaluation , referred by Dr. Catalina Tinajero. Episodes of tachy since December--minimal to no sx but notices when checks pulse/BP. To ER in 2019--sinus tachy, seen in f/u by Dr. Landy Serna with episodes afib/flutter, tachy and now on Metoprolol /d, started on Eliquis. When seen in  in afib , --no sx  and unaware--Cardizem  added. Minimal to no sx, reviewed afib options including ablation--preferred conservative rx. Hx hypertension, dyslipidemia, DM II (diet). Former tobacco--quit 20 yrs ago, mod ETOH. No hx CAD, CHF, valvular, rheumatic, other. Currently doing well. The patient denies chest pain/ shortness of breath, orthopnea, PND, LE edema, palpitations, syncope, presyncope or fatigue.        Patient Active Problem List    Diagnosis Date Noted    Controlled type 2 diabetes mellitus without complication, without long-term current use of insulin ([facility] [address] 02/10/2020    PAF (paroxysmal atrial fibrillation) ([facility] [address]     Hypertension     Hypercholesterolemia       Helio Frost MD  Past Medical History:   Diagnosis Date    Calculus of kidney     Hypercholesterolemia     Hypertension     PAF (paroxysmal atrial fibrillation) (HCC)       Past Surgical History:   Procedure Laterality Date    COLONOSCOPY N/A 2018    COLONOSCOPY performed by Kaz Batres MD at \[facility]"" 1827 HX COLONOSCOPY      HX COLONOSCOPY  2018    polyp, repeat in     HX HERNIA REPAIR       Allergies   Allergen Reactions    Codeine Rash      Family History   Problem Relation Age of Onset    Ovarian Cancer Mother     Heart Disease Father         CAD, s/p valve,  80    Liver Disease Father       Social History     Socioeconomic History    Marital status:      Spouse name: Not on file    Number of children: Not on file    Years of education: Not on file   Gypsy Charles Highest education level: Not on file   Occupational History    Not on file   Social Needs    Financial resource strain: Not on file    Food insecurity     Worry: Not on file     Inability: Not on file    Transportation needs     Medical: Not on file     Non-medical: Not on file   Tobacco Use    Smoking status: Former Smoker     Packs/day: 1.00     Years: 30.00     Pack years: 30.00     Types: Cigarettes     Start date: 1972     Last attempt to quit: 2007     Years since quittin.7    Smokeless tobacco: Never Used   Substance and Sexual Activity    Alcohol use: Yes    Drug use: Not on file    Sexual activity: Not on file   Lifestyle    Physical activity     Days per week: Not on file     Minutes per session: Not on file    Stress: Not on file   Relationships    Social connections     Talks on phone: Not on file     Gets together: Not on file     Attends Hinduism service: Not on file     Active member of club or organization: Not on file     Attends meetings of clubs or organizations: Not on file     Relationship status: Not on file    Intimate partner violence     Fear of current or ex partner: Not on file     Emotionally abused: Not on file     Physically abused: Not on file     Forced sexual activity: Not on file   Other Topics Concern    Not on file   Social History Narrative    Not on file      Current Outpatient Medications   Medication Sig    apixaban (ELIQUIS) 5 mg tablet Take 5 mg by mouth two (2) times a day.  dilTIAZem CD (CARDIZEM CD) 180 mg ER capsule Take 1 Cap by mouth daily.  losartan (COZAAR) 100 mg tablet Take 100 mg by mouth two (2) times a day.  aspirin-calcium carbonate 81 mg-300 mg calcium(777 mg) tab Take 81 mg by mouth.  allopurinol (ZYLOPRIM) 300 mg tablet Take 300 mg by mouth daily.  atorvastatin (LIPITOR) 10 mg tablet Take 10 mg by mouth daily.  metoprolol succinate (TOPROL-XL) 200 mg XL tablet Take 200 mg by mouth daily.      No current facility-administered medications for this visit. Review of Symptoms:    CONST  No weight change. No fever, chills, sweats    ENT No visual changes, URI sx, sore throat    CV  See HPI   RESP  No cough, or sputum, wheezing. Also see HPI   GI  No abdominal pain or change in bowel habits. No heartburn or dysphagia. No melena or rectal bleeding.   No dysuria, urgency, frequency, hematuria   MSKEL  No joint pain, swelling. No muscle pain. SKIN  No rash or lesions. NEURO  No headache, syncope, or seizure. No weakness, loss of sensation, or paresthesias. PSYCH  No low mood or depression  No anxiety. HE/LYMPH  No easy bruising, abnormal bleeding, or enlarged glands. Physical ExamPhysical Exam:    Visit Vitals  BP (!) 142/100 (BP 1 Location: Left arm, BP Patient Position: Sitting)   Pulse 80   Temp 97 °F (36.1 °C) (Temporal)   Resp 16   Ht 5' 11\" (1.803 m)   Wt 191 lb (86.6 kg)   SpO2 99% Comment: ra   BMI 26.64 kg/m²     Gen: NAD  HEENT:  PERRL, throat clear  Neck: no adenopathy, no thyromegaly, no JVD   Heart:  Regular,Nl S1S2,  no murmur, gallop or rub. Lungs:  clear  Abdomen:   Soft, non-tender, bowel sounds are active. Extremities:  No edema  Pulse: symmetric  Neuro: A&O times 3, No focal neuro deficits    Cardiographics    ECG: NSR, RBBB w/ repol changes, possible RVH    Labs:   Lab Results   Component Value Date/Time    Sodium 140 12/08/2019 11:02 AM    Potassium 4.1 12/08/2019 11:02 AM    Chloride 103 12/08/2019 11:02 AM    CO2 27 12/08/2019 11:02 AM    Anion gap 10 12/08/2019 11:02 AM    Glucose 127 (H) 12/08/2019 11:02 AM    BUN 20 12/08/2019 11:02 AM    Creatinine 1.09 12/08/2019 11:02 AM    BUN/Creatinine ratio 18 12/08/2019 11:02 AM    GFR est AA >60 12/08/2019 11:02 AM    GFR est non-AA >60 12/08/2019 11:02 AM    Calcium 10.2 (H) 12/08/2019 11:02 AM    Bilirubin, total 0.8 12/08/2019 11:02 AM    Alk.  phosphatase 59 12/08/2019 11:02 AM    Protein, total 7.3 12/08/2019 11:02 AM    Albumin 4.2 12/08/2019 11:02 AM    Globulin 3.1 12/08/2019 11:02 AM    A-G Ratio 1.4 12/08/2019 11:02 AM    ALT (SGPT) 45 12/08/2019 11:02 AM     No results found for: CPK, CPKX, CPX  No results found for: CHOL, CHOLX, CHLST, CHOLV, 142712, HDL, HDLP, LDL, LDLC, DLDLP, TGLX, TRIGL, TRIGP, CHHD, CHHDX  No results found for this or any previous visit. Assessment:         Patient Active Problem List    Diagnosis Date Noted    Controlled type 2 diabetes mellitus without complication, without long-term current use of insulin ([facility] [address] 02/10/2020    PAF (paroxysmal atrial fibrillation) ([facility] [address]     Hypertension     Hypercholesterolemia      Seen here for cardiac evaluation 2/20, referred by Dr. Vassie Heimlich. Episodes of tachy since December--minimal to no sx but notices when checks pulse/BP. To ER in December 2019--sinus tachy, seen in f/u by Dr. Ez Figueroa with episodes afib/flutter, tachy and now on Metoprolol /d, started on Eliquis. When seen in 2/20 in afib  --no sx  and unaware--Cardizem  added. Minimal to no sx, reviewed afib options including ablation--preferred conservative rx. Hx hypertension, dyslipidemia, DM II (diet). Former tobacco--quit 20 yrs ago, mod ETOH. No hx CAD, CHF, valvular, rheumatic, other. Currently doing well. Plan:     Doing well with no adverse cardiac symptoms. Lipids and labs followed by PCP. Continue current care and f/u in 6 months.     Jeanie Yeager MD

## 2021-05-21 ENCOUNTER — OFFICE VISIT (OUTPATIENT)
Dept: CARDIOLOGY CLINIC | Age: 76
End: 2021-05-21
Payer: MEDICARE

## 2021-05-21 VITALS
HEART RATE: 54 BPM | DIASTOLIC BLOOD PRESSURE: 84 MMHG | TEMPERATURE: 97.6 F | RESPIRATION RATE: 17 BRPM | SYSTOLIC BLOOD PRESSURE: 138 MMHG | HEIGHT: 71 IN | WEIGHT: 193 LBS | OXYGEN SATURATION: 99 % | BODY MASS INDEX: 27.02 KG/M2

## 2021-05-21 DIAGNOSIS — I48.0 PAF (PAROXYSMAL ATRIAL FIBRILLATION) (HCC): Primary | ICD-10-CM

## 2021-05-21 DIAGNOSIS — I10 ESSENTIAL HYPERTENSION: ICD-10-CM

## 2021-05-21 DIAGNOSIS — I49.1 PAC (PREMATURE ATRIAL CONTRACTION): ICD-10-CM

## 2021-05-21 DIAGNOSIS — E78.00 HYPERCHOLESTEROLEMIA: ICD-10-CM

## 2021-05-21 DIAGNOSIS — E11.9 CONTROLLED TYPE 2 DIABETES MELLITUS WITHOUT COMPLICATION, WITHOUT LONG-TERM CURRENT USE OF INSULIN (HCC): ICD-10-CM

## 2021-05-21 DIAGNOSIS — I45.10 RBBB: ICD-10-CM

## 2021-05-21 PROCEDURE — G8419 CALC BMI OUT NRM PARAM NOF/U: HCPCS | Performed by: INTERNAL MEDICINE

## 2021-05-21 PROCEDURE — G8427 DOCREV CUR MEDS BY ELIG CLIN: HCPCS | Performed by: INTERNAL MEDICINE

## 2021-05-21 PROCEDURE — G8510 SCR DEP NEG, NO PLAN REQD: HCPCS | Performed by: INTERNAL MEDICINE

## 2021-05-21 PROCEDURE — 93000 ELECTROCARDIOGRAM COMPLETE: CPT | Performed by: INTERNAL MEDICINE

## 2021-05-21 PROCEDURE — G8536 NO DOC ELDER MAL SCRN: HCPCS | Performed by: INTERNAL MEDICINE

## 2021-05-21 PROCEDURE — 1101F PT FALLS ASSESS-DOCD LE1/YR: CPT | Performed by: INTERNAL MEDICINE

## 2021-05-21 PROCEDURE — G8752 SYS BP LESS 140: HCPCS | Performed by: INTERNAL MEDICINE

## 2021-05-21 PROCEDURE — G8754 DIAS BP LESS 90: HCPCS | Performed by: INTERNAL MEDICINE

## 2021-05-21 PROCEDURE — 99214 OFFICE O/P EST MOD 30 MIN: CPT | Performed by: INTERNAL MEDICINE

## 2021-05-21 RX ORDER — VITAMIN E 268 MG
400 CAPSULE ORAL DAILY
COMMUNITY
End: 2021-11-17

## 2021-05-21 NOTE — LETTER
5/21/2021 Patient: Rosario Santos. YOB: 1945 Date of Visit: 5/21/2021 Ofe Romeo MD 
[facility] [address] [id number] [address] Fax: [phone] Dear Ofe Romeo MD, Thank you for referring Mr. Dion Marquez to [facility] for evaluation. My notes for this consultation are attached. If you have questions, please do not hesitate to call me. I look forward to following your patient along with you.  
 
 
Sincerely, 
 
Garrett Bryant MD

## 2021-05-21 NOTE — PROGRESS NOTES
Geri Fonseca. is a 68 y.o. male is here for routine f/u.  Seen here for cardiac evaluation , referred by Dr. James Asher afib/flutter. Episodes of tachy since December--minimal to no sx but notices when checks pulse/BP.  To ER in 2019--sinus tachy, seen in f/u by Dr. Shelley Rosenberg with episodes afib/flutter, tachy and now on Metoprolol /d, started on Eliquis. When seen in  in afib , --no sx  and unaware--Cardizem  added. Minimal to no sx, reviewed afib options including ablation--preferred conservative rx.   Hx hypertension, dyslipidemia, DM II (diet).  Former tobacco--quit 20 yrs ago, mod ETOH. No hx CAD, CHF, valvular, rheumatic, other. Currently doing well The patient denies chest pain/ shortness of breath, orthopnea, PND, LE edema, palpitations, syncope, presyncope or fatigue.        Patient Active Problem List    Diagnosis Date Noted    RBBB 2021    Controlled type 2 diabetes mellitus without complication, without long-term current use of insulin ([facility] [address] 02/10/2020    PAF (paroxysmal atrial fibrillation) ([facility] [address]     Hypertension     Hypercholesterolemia       Ela Roberts MD  Past Medical History:   Diagnosis Date    Calculus of kidney     Hypercholesterolemia     Hypertension     PAF (paroxysmal atrial fibrillation) (HCC)     RBBB 2021      Past Surgical History:   Procedure Laterality Date    COLONOSCOPY N/A 2018    COLONOSCOPY performed by Jose Sheppard [facility] at [facility] 1827 HX COLONOSCOPY      HX COLONOSCOPY  2018    polyp, repeat in     HX HERNIA REPAIR       Allergies   Allergen Reactions    Codeine Rash      Family History   Problem Relation Age of Onset    Ovarian Cancer Mother     Heart Disease Father         CAD, s/p valve,  80    Liver Disease Father       Social History     Socioeconomic History    Marital status:      Spouse name: Not on file    Number of children: Not on file    Years of education: Not on file    Highest education level: Not on file   Occupational History    Not on file   Tobacco Use    Smoking status: Former Smoker     Packs/day: 1.00     Years: 30.00     Pack years: 30.00     Types: Cigarettes     Start date: 1972     Quit date: 2007     Years since quittin.2    Smokeless tobacco: Never Used   Substance and Sexual Activity    Alcohol use: Yes    Drug use: Not on file    Sexual activity: Not on file   Other Topics Concern    Not on file   Social History Narrative    Not on file     Social Determinants of Health     Financial Resource Strain:     Difficulty of Paying Living Expenses:    Food Insecurity:     Worried About Running Out of Food in the Last Year:     [address] in the Last Year:    Transportation Needs:     Lack of Transportation (Medical):  Lack of Transportation (Non-Medical):    Physical Activity:     Days of Exercise per Week:     Minutes of Exercise per Session:    Stress:     Feeling of Stress :    Social Connections:     Frequency of Communication with Friends and Family:     Frequency of Social Gatherings with Friends and Family:     Attends Gnosticism Services:     Active Member of Clubs or Organizations:     Attends Club or Organization Meetings:     Marital Status:    Intimate Partner Violence:     Fear of Current or Ex-Partner:     Emotionally Abused:     Physically Abused:     Sexually Abused:       Current Outpatient Medications   Medication Sig    vitamin E (AQUA GEMS) 400 unit capsule Take 400 Units by mouth daily.  apixaban (ELIQUIS) 5 mg tablet Take 5 mg by mouth two (2) times a day.  dilTIAZem CD (CARDIZEM CD) 180 mg ER capsule Take 1 Cap by mouth daily.  losartan (COZAAR) 100 mg tablet Take 100 mg by mouth two (2) times a day.  aspirin-calcium carbonate 81 mg-300 mg calcium(777 mg) tab Take 81 mg by mouth.  allopurinol (ZYLOPRIM) 300 mg tablet Take 300 mg by mouth daily.     atorvastatin (LIPITOR) 10 mg tablet Take 10 mg by mouth daily.  metoprolol succinate (TOPROL-XL) 200 mg XL tablet Take 200 mg by mouth daily. No current facility-administered medications for this visit. Review of Symptoms:    CONST  No weight change. No fever, chills, sweats    ENT No visual changes, URI sx, sore throat    CV  See HPI   RESP  No cough, or sputum, wheezing. Also see HPI   GI  No abdominal pain or change in bowel habits. No heartburn or dysphagia. No melena or rectal bleeding.   No dysuria, urgency, frequency, hematuria   MSKEL  No joint pain, swelling. No muscle pain. SKIN  No rash or lesions. NEURO  No headache, syncope, or seizure. No weakness, loss of sensation, or paresthesias. PSYCH  No low mood or depression  No anxiety. HE/LYMPH  No easy bruising, abnormal bleeding, or enlarged glands. Physical ExamPhysical Exam:    Visit Vitals  /84   Pulse (!) 54   Temp 97.6 °F (36.4 °C) (Temporal)   Resp 17   Ht 5' 11\" (1.803 m)   Wt 193 lb (87.5 kg)   SpO2 99%   BMI 26.92 kg/m²     Gen: NAD  HEENT:  PERRL, throat clear  Neck: no adenopathy, no thyromegaly, no JVD   Heart:  sl irregular,Nl S1S2,  no murmur, gallop or rub. Lungs:  clear  Abdomen:   Soft, non-tender, bowel sounds are active.    Extremities:  No edema  Pulse: symmetric  Neuro: A&O times 3, No focal neuro deficits    Cardiographics    ECG: NSR, PAC\"s, RBBB        Assessment:         Patient Active Problem List    Diagnosis Date Noted    RBBB 05/21/2021    Controlled type 2 diabetes mellitus without complication, without long-term current use of insulin ([facility] [address] 02/10/2020    PAF (paroxysmal atrial fibrillation) ([facility] [address]     Hypertension     Hypercholesterolemia       Seen here for cardiac evaluation 2/20, referred by Dr. Marcie Nelson of [facility] since December--minimal to no sx but notices when checks pulse/BP.  To ER in December 2019--sinus tachy, seen in f/u by  [place]--Holter with episodes afib/flutter, tachy and now on Metoprolol /d, started on Eliquis. When seen in 2/20 in afib , --no sx  and unaware--Cardizem  added. Minimal to no sx, reviewed afib options including ablation--preferred conservative rx. Echo 2/20 with LVEF 65-70, mild AV thickening, normal LV, trace MR, mild pulm hypertension.   Hx hypertension, dyslipidemia, DM II (diet).  Former tobacco--quit 20 yrs ago, mod ETOH. No hx CAD, CHF, valvular, rheumatic, other. Currently doing well      Plan:     Doing well with no adverse cardiac symptoms. Lipids and labs followed by PCP. Continue current care and f/u in 6 months.     Angle Stinson MD

## 2021-05-21 NOTE — PROGRESS NOTES
Identified pt with two pt identifiers(name and ). Reviewed record in preparation for visit and have obtained necessary documentation. Chief Complaint   Patient presents with    Irregular Heart Beat     f/u; pt reports no other concerns at this time    Hypertension      Vitals:    21 0953 21 1006   BP: (!) 164/84 (!) 160/86   Pulse: (!) 54    Resp: 17    Temp: 97.6 °F (36.4 °C)    TempSrc: Temporal    SpO2: 99%    Weight: 193 lb (87.5 kg)    Height: 5' 11\" (1.803 m)    PainSc:   0 - No pain        Health Maintenance Review: Patient reminded of \"due or due soon\" health maintenance. I have asked the patient to contact his/her primary care provider (PCP) for follow-up on his/her health maintenance. Coordination of Care Questionnaire:  :   1) Have you been to an emergency room, urgent care, or hospitalized since your last visit? If yes, where when, and reason for visit? no       2. Have seen or consulted any other health care provider since your last visit? If yes, where when, and reason for visit? NO      Patient is accompanied by self I have received verbal consent from Felipe Londono. to discuss any/all medical information while they are present in the room.

## 2021-10-21 ENCOUNTER — HOSPITAL ENCOUNTER (OUTPATIENT)
Dept: INFUSION THERAPY | Age: 76
Discharge: HOME | End: 2021-10-21
Payer: MEDICARE

## 2021-10-21 ENCOUNTER — OFFICE VISIT (OUTPATIENT)
Dept: ONCOLOGY | Age: 76
End: 2021-10-21
Payer: MEDICARE

## 2021-10-21 VITALS
HEIGHT: 66 IN | TEMPERATURE: 98.3 F | RESPIRATION RATE: 16 BRPM | DIASTOLIC BLOOD PRESSURE: 77 MMHG | HEART RATE: 89 BPM | WEIGHT: 191.4 LBS | OXYGEN SATURATION: 97 % | SYSTOLIC BLOOD PRESSURE: 150 MMHG | BODY MASS INDEX: 30.76 KG/M2

## 2021-10-21 DIAGNOSIS — E83.52 HYPERCALCEMIA: ICD-10-CM

## 2021-10-21 DIAGNOSIS — Z12.2 SCREENING FOR LUNG CANCER: ICD-10-CM

## 2021-10-21 DIAGNOSIS — Z87.891 PERSONAL HISTORY OF NICOTINE DEPENDENCE: ICD-10-CM

## 2021-10-21 DIAGNOSIS — Z72.0 TOBACCO ABUSE: Primary | ICD-10-CM

## 2021-10-21 DIAGNOSIS — Z72.0 TOBACCO ABUSE: ICD-10-CM

## 2021-10-21 PROCEDURE — G8536 NO DOC ELDER MAL SCRN: HCPCS | Performed by: INTERNAL MEDICINE

## 2021-10-21 PROCEDURE — G8754 DIAS BP LESS 90: HCPCS | Performed by: INTERNAL MEDICINE

## 2021-10-21 PROCEDURE — G8753 SYS BP > OR = 140: HCPCS | Performed by: INTERNAL MEDICINE

## 2021-10-21 PROCEDURE — G8419 CALC BMI OUT NRM PARAM NOF/U: HCPCS | Performed by: INTERNAL MEDICINE

## 2021-10-21 PROCEDURE — G8427 DOCREV CUR MEDS BY ELIG CLIN: HCPCS | Performed by: INTERNAL MEDICINE

## 2021-10-21 PROCEDURE — 36415 COLL VENOUS BLD VENIPUNCTURE: CPT

## 2021-10-21 PROCEDURE — G8510 SCR DEP NEG, NO PLAN REQD: HCPCS | Performed by: INTERNAL MEDICINE

## 2021-10-21 PROCEDURE — 82652 VIT D 1 25-DIHYDROXY: CPT

## 2021-10-21 PROCEDURE — 99203 OFFICE O/P NEW LOW 30 MIN: CPT | Performed by: INTERNAL MEDICINE

## 2021-10-21 PROCEDURE — 82306 VITAMIN D 25 HYDROXY: CPT

## 2021-10-21 PROCEDURE — 1101F PT FALLS ASSESS-DOCD LE1/YR: CPT | Performed by: INTERNAL MEDICINE

## 2021-10-21 PROCEDURE — 82397 CHEMILUMINESCENT ASSAY: CPT

## 2021-10-21 RX ORDER — ASPIRIN 325 MG
650 TABLET ORAL AS NEEDED
COMMUNITY
End: 2021-11-17 | Stop reason: ALTCHOICE

## 2021-10-21 NOTE — PROGRESS NOTES
[facility] Lab Visit:    Ollie Tinoco  [birth year]  [id number]    14:05:  Pt arrived ambulatory. Labs drawn peripherally and sent for processing. Labs drawn for Dr. Antoni Bajwa. Departed [facility] ambulatory and in no distress.

## 2021-10-21 NOTE — PROGRESS NOTES
Reason for Visit:   Laura Glaalfredo. is a 68 y.o. male who is seen for further eval of hypercalcemia and increased psa    Treatment History:   Dx: Hypercalcemia and elevated psa    History of Present Illness:   Here for further eval of elevated PSA and hypercalcemia. Following PSA with Dr Aleida Mclean in [place]. States PSA has ocsillated--high as 7. Dr Aleida Mclean doesn't want to image or biopsy until psa is over 8. Hypercalcemia was found incidentally. Has slight elevation as far as 2018 from our records. With elevated PSA and high calcium there is concern for underlying malignancy. Patient has tobacco hx--had low dose chest in 2017--3mm nodule that did not progress after 2018 scan. Mother with ovarian, father with liver ca. Past Medical History:   Diagnosis Date    Calculus of kidney     Hypercholesterolemia     Hypertension     PAF (paroxysmal atrial fibrillation) (HCC)     RBBB 2021      Past Surgical History:   Procedure Laterality Date    COLONOSCOPY N/A 2018    COLONOSCOPY performed by Bhargavi Craig MD at [facility] 1827 HX COLONOSCOPY      HX COLONOSCOPY  2018    polyp, repeat in     HX HERNIA REPAIR        Social History     Tobacco Use    Smoking status: Former Smoker     Packs/day: 1.00     Years: 30.00     Pack years: 30.00     Types: Cigarettes     Start date: 1972     Quit date: 2007     Years since quittin.6    Smokeless tobacco: Never Used   Substance Use Topics    Alcohol use: Yes      Family History   Problem Relation Age of Onset    Ovarian Cancer Mother     Heart Disease Father         CAD, s/p valve,  80    Liver Disease Father      Current Outpatient Medications   Medication Sig    vitamin E (AQUA GEMS) 400 unit capsule Take 400 Units by mouth daily.  apixaban (ELIQUIS) 5 mg tablet Take 5 mg by mouth two (2) times a day.  dilTIAZem CD (CARDIZEM CD) 180 mg ER capsule Take 1 Cap by mouth daily.     losartan (COZAAR) 100 mg tablet Take 100 mg by mouth two (2) times a day.  aspirin-calcium carbonate 81 mg-300 mg calcium(777 mg) tab Take 81 mg by mouth.  allopurinol (ZYLOPRIM) 300 mg tablet Take 300 mg by mouth daily.  atorvastatin (LIPITOR) 10 mg tablet Take 10 mg by mouth daily.  metoprolol succinate (TOPROL-XL) 200 mg XL tablet Take 200 mg by mouth daily. No current facility-administered medications for this visit. Allergies   Allergen Reactions    Codeine Rash        Review of Systems: A complete review of systems was obtained, negative except as described above. Physical Exam:   There were no vitals taken for this visit. ECOG PS:   General: No distress  Eyes: PERRLA, anicteric sclerae  HENT: Atraumatic, OP clear  Neck: Supple  Lymphatic: No cervical, supraclavicular, or inguinal adenopathy  Respiratory: CTAB, normal respiratory effort  CV: Normal rate, regular rhythm, no murmurs, no peripheral edema  GI: Soft, nontender, nondistended, no masses, no hepatomegaly, no splenomegaly  MS: Normal gait and station. Digits without clubbing or cyanosis. Skin: No rashes, ecchymoses, or petechiae. Normal temperature, turgor, and texture. Psych: Alert, oriented, appropriate affect, normal judgment/insight    Results:     Lab Results   Component Value Date/Time    WBC 8.5 12/08/2019 11:02 AM    HGB 17.1 (H) 12/08/2019 11:02 AM    HCT 49.9 12/08/2019 11:02 AM    PLATELET 301 (L) 76/12/5773 11:02 AM    MCV 96.5 12/08/2019 11:02 AM    ABS.  NEUTROPHILS 6.1 12/08/2019 11:02 AM     Lab Results   Component Value Date/Time    Sodium 140 12/08/2019 11:02 AM    Potassium 4.1 12/08/2019 11:02 AM    Chloride 103 12/08/2019 11:02 AM    CO2 27 12/08/2019 11:02 AM    Glucose 127 (H) 12/08/2019 11:02 AM    BUN 20 12/08/2019 11:02 AM    Creatinine 1.09 12/08/2019 11:02 AM    GFR est AA >60 12/08/2019 11:02 AM    GFR est non-AA >60 12/08/2019 11:02 AM    Calcium 10.2 (H) 12/08/2019 11:02 AM     Lab Results   Component Value Date/Time Bilirubin, total 0.8 12/08/2019 11:02 AM    ALT (SGPT) 45 12/08/2019 11:02 AM    Alk. phosphatase 59 12/08/2019 11:02 AM    Protein, total 7.3 12/08/2019 11:02 AM    Albumin 4.2 12/08/2019 11:02 AM    Globulin 3.1 12/08/2019 11:02 AM       CT Low Dose Chest 2018  IMPRESSION:   1. 3 mm right middle lobe lung nodule unchanged. This is a 13 month follow-up  examination. 2. Atherosclerosis with mild coronary artery calcification. Lung-RADS Category: 2. Benign behavior or appearance. Management recommendation: Continue annual screening with LDCT in 12 months if  patient meets the criteria for screening. Assessment:   1) Hypercalcemia  2) Elevated PSA      Plan:   1) Check PTH rp, Check 1, 25 D, 25 D, urine calcium for Hyperparathyroid. 2) Following with Dr Maira Gtz defer any biopsy or treatment to him. I think hyperparathyroid  Rule out Lung Ca with low dose chest  Check 1, 25 d and 25 d and urine calcium excretion  PTH rp as well.   Back in 1 month    Signed By: Lindsey West MD

## 2021-10-21 NOTE — LETTER
3/24/2022    Patient: Magi Morgan. YOB: 1945   Date of Visit: 10/21/2021     Jorje Gomez MD  [facility] [address] [id number]  [address] Fax: [phone]    Dear Jorje Gomez MD,      Thank you for referring Mr. Jd Yañez to [facility] for evaluation. My notes for this consultation are attached. If you have questions, please do not hesitate to call me. I look forward to following your patient along with you.       Sincerely,    Echo Pike MD

## 2021-10-21 NOTE — PROGRESS NOTES
Bang Aragon. is a 68 y.o. male new patient today referred by DR Tima Olivas due abnormal labs. Patient denies complaints       Patient hypertensive,  She will monitor  BP at home, and follow up with PCP if it remains 140/80 or greater. DR Huynh Carmine aware. Key Oncology Meds     Patient is on no Oncologic meds. Key Pain Meds             aspirin (ASPIRIN) 325 mg tablet (Taking) Take 650 mg by mouth daily as needed for Pain.

## 2021-10-22 LAB — 25(OH)D3 SERPL-MCNC: 52.3 NG/ML (ref 30–100)

## 2021-10-24 LAB — 1,25(OH)2D3 SERPL-MCNC: 101 PG/ML (ref 19.9–79.3)

## 2021-10-31 LAB — PTH RELATED PROT SERPL-SCNC: <2 PMOL/L

## 2021-11-01 ENCOUNTER — TELEPHONE (OUTPATIENT)
Dept: ONCOLOGY | Age: 76
End: 2021-11-01

## 2021-11-01 DIAGNOSIS — Z12.2 SCREENING FOR LUNG CANCER: ICD-10-CM

## 2021-11-01 DIAGNOSIS — Z72.0 TOBACCO ABUSE: ICD-10-CM

## 2021-11-01 DIAGNOSIS — Z87.891 PERSONAL HISTORY OF NICOTINE DEPENDENCE: Primary | ICD-10-CM

## 2021-11-04 NOTE — PROGRESS NOTES
Spoke with patient and Dr Tricia Aguirre. He will stop supplemental Vit D. We will re check his calcium to see if it normalizes.

## 2021-11-08 ENCOUNTER — TELEPHONE (OUTPATIENT)
Dept: ONCOLOGY | Age: 76
End: 2021-11-08

## 2021-11-08 NOTE — TELEPHONE ENCOUNTER
Patient called, stated that he has a chest cold & is scheduled to have a CT lung screen on 11/9/202. Asked if he should still have test done. Number to scheduling given to patient to verify.

## 2021-11-12 ENCOUNTER — HOSPITAL ENCOUNTER (OUTPATIENT)
Dept: INFUSION THERAPY | Age: 76
Discharge: HOME | End: 2021-11-12
Payer: MEDICARE

## 2021-11-12 DIAGNOSIS — E83.52 HYPERCALCEMIA: ICD-10-CM

## 2021-11-12 DIAGNOSIS — E83.52 HYPERCALCEMIA: Primary | ICD-10-CM

## 2021-11-12 LAB
25(OH)D3 SERPL-MCNC: 58.3 NG/ML (ref 30–100)
ANION GAP SERPL CALC-SCNC: 9 MMOL/L (ref 5–15)
BUN SERPL-MCNC: 21 MG/DL (ref 6–20)
BUN/CREAT SERPL: 17 (ref 12–20)
CALCIUM SERPL-MCNC: 10 MG/DL (ref 8.5–10.1)
CHLORIDE SERPL-SCNC: 105 MMOL/L (ref 97–108)
CO2 SERPL-SCNC: 28 MMOL/L (ref 21–32)
CREAT SERPL-MCNC: 1.22 MG/DL (ref 0.7–1.3)
GLUCOSE SERPL-MCNC: 109 MG/DL (ref 65–100)
POTASSIUM SERPL-SCNC: 4.3 MMOL/L (ref 3.5–5.1)
SODIUM SERPL-SCNC: 142 MMOL/L (ref 136–145)

## 2021-11-12 PROCEDURE — 80048 BASIC METABOLIC PNL TOTAL CA: CPT

## 2021-11-12 PROCEDURE — 36415 COLL VENOUS BLD VENIPUNCTURE: CPT

## 2021-11-12 PROCEDURE — 82652 VIT D 1 25-DIHYDROXY: CPT

## 2021-11-12 PROCEDURE — 82306 VITAMIN D 25 HYDROXY: CPT

## 2021-11-12 NOTE — PROGRESS NOTES
[facility] Lab Visit:    Eligio Yadav  [birth year]  [id number]    08:55:  Pt arrived ambulatory. Labs drawn peripherally and sent for processing. Labs drawn for Dr. Yrn Kimbrough. Departed [facility] ambulatory and in no distress.

## 2021-11-14 LAB — 1,25(OH)2D3 SERPL-MCNC: 71.3 PG/ML (ref 19.9–79.3)

## 2021-11-16 ENCOUNTER — TELEPHONE (OUTPATIENT)
Dept: ONCOLOGY | Age: 76
End: 2021-11-16

## 2021-11-16 ENCOUNTER — HOSPITAL ENCOUNTER (OUTPATIENT)
Dept: CT IMAGING | Age: 76
Discharge: HOME | End: 2021-11-16
Attending: INTERNAL MEDICINE
Payer: MEDICARE

## 2021-11-16 DIAGNOSIS — Z87.891 PERSONAL HISTORY OF NICOTINE DEPENDENCE: ICD-10-CM

## 2021-11-16 DIAGNOSIS — Z12.2 SCREENING FOR LUNG CANCER: ICD-10-CM

## 2021-11-16 DIAGNOSIS — Z72.0 TOBACCO ABUSE: ICD-10-CM

## 2021-11-16 PROCEDURE — 71271 CT THORAX LUNG CANCER SCR C-: CPT

## 2021-11-16 NOTE — TELEPHONE ENCOUNTER
----- Message from Stella Gillette MD sent at 11/15/2021  5:58 PM EST -----  Vitamin D levels have improved--please inform--stay off for now

## 2021-11-16 NOTE — TELEPHONE ENCOUNTER
HIPAA verified. Notified patient of lab results per DR Juarez's note. Patient verbalized understanding and thanked for call.

## 2021-11-16 NOTE — TELEPHONE ENCOUNTER
----- Message from Nicolas rOdoñez MD sent at 11/15/2021  5:58 PM EST -----  Vitamin D levels have improved--please inform--stay off for now

## 2021-11-17 ENCOUNTER — OFFICE VISIT (OUTPATIENT)
Dept: CARDIOLOGY CLINIC | Age: 76
End: 2021-11-17
Payer: MEDICARE

## 2021-11-17 VITALS
WEIGHT: 187 LBS | HEIGHT: 66 IN | HEART RATE: 87 BPM | TEMPERATURE: 98.6 F | SYSTOLIC BLOOD PRESSURE: 138 MMHG | RESPIRATION RATE: 18 BRPM | OXYGEN SATURATION: 98 % | DIASTOLIC BLOOD PRESSURE: 82 MMHG | BODY MASS INDEX: 30.05 KG/M2

## 2021-11-17 DIAGNOSIS — E78.00 HYPERCHOLESTEROLEMIA: ICD-10-CM

## 2021-11-17 DIAGNOSIS — I10 ESSENTIAL HYPERTENSION: ICD-10-CM

## 2021-11-17 DIAGNOSIS — I45.10 RBBB: ICD-10-CM

## 2021-11-17 DIAGNOSIS — I48.0 PAF (PAROXYSMAL ATRIAL FIBRILLATION) (HCC): Primary | ICD-10-CM

## 2021-11-17 PROCEDURE — G8427 DOCREV CUR MEDS BY ELIG CLIN: HCPCS | Performed by: INTERNAL MEDICINE

## 2021-11-17 PROCEDURE — 93000 ELECTROCARDIOGRAM COMPLETE: CPT | Performed by: INTERNAL MEDICINE

## 2021-11-17 PROCEDURE — 1101F PT FALLS ASSESS-DOCD LE1/YR: CPT | Performed by: INTERNAL MEDICINE

## 2021-11-17 PROCEDURE — G8754 DIAS BP LESS 90: HCPCS | Performed by: INTERNAL MEDICINE

## 2021-11-17 PROCEDURE — G8419 CALC BMI OUT NRM PARAM NOF/U: HCPCS | Performed by: INTERNAL MEDICINE

## 2021-11-17 PROCEDURE — G8510 SCR DEP NEG, NO PLAN REQD: HCPCS | Performed by: INTERNAL MEDICINE

## 2021-11-17 PROCEDURE — 99214 OFFICE O/P EST MOD 30 MIN: CPT | Performed by: INTERNAL MEDICINE

## 2021-11-17 PROCEDURE — G8752 SYS BP LESS 140: HCPCS | Performed by: INTERNAL MEDICINE

## 2021-11-17 PROCEDURE — G8536 NO DOC ELDER MAL SCRN: HCPCS | Performed by: INTERNAL MEDICINE

## 2021-11-17 NOTE — PROGRESS NOTES
Identified pt with two pt identifiers(name and ). Reviewed record in preparation for visit and have obtained necessary documentation. Chief Complaint   Patient presents with    Irregular Heart Beat     6 month follow up    Hypertension      Vitals:    21 0921   BP: (!) 150/90   Pulse: 87   Resp: 18   Temp: 98.6 °F (37 °C)   TempSrc: Temporal   SpO2: 98%   Weight: 187 lb (84.8 kg)   Height: 5' 6.46\" (1.688 m)   PainSc:   0 - No pain       Medications reviewed/approved by Dr. John Mendoza. Health Maintenance Review: Patient reminded of \"due or due soon\" health maintenance. I have asked the patient to contact his/her primary care provider (PCP) for follow-up on his/her health maintenance. Coordination of Care Questionnaire:  :   1) Have you been to an emergency room, urgent care, or hospitalized since your last visit? If yes, where when, and reason for visit? no       2. Have seen or consulted any other health care provider since your last visit? If yes, where when, and reason for visit? Yes, Dr. Mitra Cat pcp for r/c. Patient is accompanied by self I have received verbal consent from Pratima Franks to discuss any/all medical information while they are present in the room.

## 2021-11-17 NOTE — PROGRESS NOTES
Bere Morales is a 68 y.o. male is here for routine f/u. Kelsey Ross for cardiac evaluation , referred by Dr. Arianne Andrew afib/flutter. Episodes of tachy since December--minimal to no sx but notices when checks pulse/BP.  To ER in 2019--sinus tachy, seen in f/u by Dr. Sanjana Bocanegra with episodes afib/flutter, tachy and now on Metoprolol /d, started on Eliquis. When seen in  in afib , --no sx  and unaware--Cardizem  added.  Minimal to no sx, reviewed afib options including ablation--preferred conservative rx.   Hx hypertension, dyslipidemia, DM II (diet).  Former tobacco--quit 20 yrs ago, mod ETOH. No hx CAD, CHF, valvular, rheumatic, other. Currently doing well. The patient denies chest pain/ shortness of breath, orthopnea, PND, LE edema, palpitations, syncope, presyncope or fatigue.        Patient Active Problem List    Diagnosis Date Noted    RBBB 2021    Controlled type 2 diabetes mellitus without complication, without long-term current use of insulin ([address] 02/10/2020    PAF (paroxysmal atrial fibrillation) ([address]     Hypertension     Hypercholesterolemia       Sandip Raymundo MD  Past Medical History:   Diagnosis Date    Calculus of kidney     Hypercholesterolemia     Hypertension     [facility]     RBBB 2021      Past Surgical History:   Procedure Laterality Date    COLONOSCOPY N/A 2018    COLONOSCOPY performed by Giovanni Ordoñez MD at [facility]27 HX COLONOSCOPY      HX COLONOSCOPY  2018    polyp, repeat in     HX HERNIA REPAIR       Allergies   Allergen Reactions    Codeine Rash      Family History   Problem Relation Age of Onset    Ovarian Cancer Mother     Heart Disease Father         CAD, s/p valve,  80    Liver Disease Father     Cancer Father 80        liver    Parkinson's Disease Sister       Social History     Socioeconomic History    Marital status:  Spouse name: Not on file    Number of children: Not on file    Years of education: Not on file    Highest education level: Not on file   Occupational History    Not on file   Tobacco Use    Smoking status: Former Smoker     Packs/day: 1.00     Years: 30.00     Pack years: 30.00     Types: Cigarettes     Start date: 1972     Quit date: 2007     Years since quittin.7    Smokeless tobacco: Never Used   Vaping Use    Vaping Use: Never used   Substance and Sexual Activity    Alcohol use: Yes    Drug use: Never    Sexual activity: Not on file   Other Topics Concern    Not on file   Social History Narrative    Not on file     Social Determinants of Health     Financial Resource Strain:     Difficulty of Paying Living Expenses: Not on file   Food Insecurity:     Worried About Running Out of Food in the Last Year: Not on file    Abdi of Food in the Last Year: Not on file   Transportation Needs:     Lack of Transportation (Medical): Not on file    Lack of Transportation (Non-Medical):  Not on file   Physical Activity:     Days of Exercise per Week: Not on file    Minutes of Exercise per Session: Not on file   Stress:     Feeling of Stress : Not on file   Social Connections:     Frequency of Communication with Friends and Family: Not on file    Frequency of Social Gatherings with Friends and Family: Not on file    Attends Anglican Services: Not on file    Active Member of [address] [place] [address] or Organizations: Not on file    Attends Club or Organization Meetings: Not on file    Marital Status: Not on file   Intimate Partner Violence:     Fear of Current or Ex-Partner: Not on file    Emotionally Abused: Not on file    Physically Abused: Not on file    Sexually Abused: Not on file   Housing Stability:     Unable to Pay for Housing in the Last Year: Not on file    Number of [place] in the Last Year: Not on file    Unstable Housing in the Last Year: Not on file      Current Outpatient Medications   Medication Sig    FA/niacinamide/cupric ox/Zn ox (NICOTINAMIDE PO) Take  by mouth daily.  aspirin (ASPIRIN) 325 mg tablet Take 650 mg by mouth as needed for Pain.  apixaban (ELIQUIS) 5 mg tablet Take 5 mg by mouth two (2) times a day.  dilTIAZem CD (CARDIZEM CD) 180 mg ER capsule Take 1 Cap by mouth daily.  losartan (COZAAR) 100 mg tablet Take 100 mg by mouth daily.  allopurinol (ZYLOPRIM) 300 mg tablet Take 300 mg by mouth daily.  atorvastatin (LIPITOR) 10 mg tablet Take 10 mg by mouth daily.  metoprolol succinate (TOPROL-XL) 200 mg XL tablet Take 200 mg by mouth daily.  vitamin E (AQUA GEMS) 400 unit capsule Take 400 Units by mouth daily. (Patient not taking: Reported on 10/21/2021)    aspirin-calcium carbonate 81 mg-300 mg calcium(777 mg) tab Take 81 mg by mouth. (Patient not taking: Reported on 10/21/2021)     No current facility-administered medications for this visit. Review of Symptoms:    CONST  No weight change. No fever, chills, sweats    ENT No visual changes, URI sx, sore throat    CV  See HPI   RESP  No cough, or sputum, wheezing. Also see HPI   GI  No abdominal pain or change in bowel habits. No heartburn or dysphagia. No melena or rectal bleeding.   No dysuria, urgency, frequency, hematuria   MSKEL  No joint pain, swelling. No muscle pain. SKIN  No rash or lesions. NEURO  No headache, syncope, or seizure. No weakness, loss of sensation, or paresthesias. PSYCH  No low mood or depression  No anxiety. HE/LYMPH  No easy bruising, abnormal bleeding, or enlarged glands.         Physical ExamPhysical Exam:    Visit Vitals  /82 (BP 1 Location: Left upper arm, BP Patient Position: Sitting, BP Cuff Size: Adult)   Pulse 87   Temp 98.6 °F (37 °C) (Temporal)   Resp 18   Ht 5' 6.46\" (1.688 m)   Wt 187 lb (84.8 kg)   SpO2 98% Comment: ra   BMI 29.77 kg/m²     Gen: NAD  HEENT:  PERRL, throat clear  Neck: no adenopathy, no thyromegaly, no JVD Heart:  Regular,Nl S1S2,  no murmur, gallop or rub. Lungs:  clear  Abdomen:   Soft, non-tender, bowel sounds are active. Extremities:  No edema  Pulse: symmetric  Neuro: A&O times 3, No focal neuro deficits    Cardiographics    ECG: NSR, RBBB    Labs:   Lab Results   Component Value Date/Time    Sodium 142 11/12/2021 09:07 AM    Sodium 140 12/08/2019 11:02 AM    Potassium 4.3 11/12/2021 09:07 AM    Potassium 4.1 12/08/2019 11:02 AM    Chloride 105 11/12/2021 09:07 AM    Chloride 103 12/08/2019 11:02 AM    CO2 28 11/12/2021 09:07 AM    CO2 27 12/08/2019 11:02 AM    Anion gap 9 11/12/2021 09:07 AM    Anion gap 10 12/08/2019 11:02 AM    Glucose 109 (H) 11/12/2021 09:07 AM    Glucose 127 (H) 12/08/2019 11:02 AM    BUN 21 (H) 11/12/2021 09:07 AM    BUN 20 12/08/2019 11:02 AM    Creatinine 1.22 11/12/2021 09:07 AM    Creatinine 1.09 12/08/2019 11:02 AM    BUN/Creatinine ratio 17 11/12/2021 09:07 AM    BUN/Creatinine ratio 18 12/08/2019 11:02 AM    GFR est AA >60 11/12/2021 09:07 AM    GFR est AA >60 12/08/2019 11:02 AM    GFR est non-AA 58 (L) 11/12/2021 09:07 AM    GFR est non-AA >60 12/08/2019 11:02 AM    Calcium 10.0 11/12/2021 09:07 AM    Calcium 10.2 (H) 12/08/2019 11:02 AM    Bilirubin, total 0.8 12/08/2019 11:02 AM    Alk. phosphatase 59 12/08/2019 11:02 AM    Protein, total 7.3 12/08/2019 11:02 AM    Albumin 4.2 12/08/2019 11:02 AM    Globulin 3.1 12/08/2019 11:02 AM    A-G Ratio 1.4 12/08/2019 11:02 AM    ALT (SGPT) 45 12/08/2019 11:02 AM     No results found for: CPK, CPKX, CPX  No results found for: CHOL, CHOLX, CHLST, CHOLV, 559141, HDL, HDLP, LDL, LDLC, DLDLP, TGLX, TRIGL, TRIGP, CHHD, CHHDX  No results found for this or any previous visit.     Assessment:         Patient Active Problem List    Diagnosis Date Noted    RBBB 05/21/2021    Controlled type 2 diabetes mellitus without complication, without long-term current use of insulin ([facility] 02/10/2020    PAF (paroxysmal atrial fibrillation) ([facility]     Hypertension     Hypercholesterolemia         Plan:     Doing well with no adverse cardiac symptoms. Remains in [facility] on [facility]--no sx. Lipids and labs followed by PCP. Continue current care and f/u in 6 months.     Mason Gutierrez MD

## 2021-11-17 NOTE — LETTER
11/17/2021    Patient: Zackary Aden. YOB: 1945   Date of Visit: 11/17/2021     González Thomas MD  [facility] [address] [id number]  Via Fax: [phone]    Dear González Thomas MD,      Thank you for referring Mr. Scott Suggs to [facility] for evaluation. My notes for this consultation are attached. If you have questions, please do not hesitate to call me. I look forward to following your patient along with you.       Sincerely,    Mason Gutierrez MD

## 2021-11-18 ENCOUNTER — OFFICE VISIT (OUTPATIENT)
Dept: ONCOLOGY | Age: 76
End: 2021-11-18
Payer: MEDICARE

## 2021-11-18 VITALS
DIASTOLIC BLOOD PRESSURE: 86 MMHG | HEIGHT: 66 IN | SYSTOLIC BLOOD PRESSURE: 126 MMHG | TEMPERATURE: 98.3 F | OXYGEN SATURATION: 98 % | RESPIRATION RATE: 16 BRPM | HEART RATE: 84 BPM | BODY MASS INDEX: 30.37 KG/M2 | WEIGHT: 189 LBS

## 2021-11-18 DIAGNOSIS — E83.52 HYPERCALCEMIA: Primary | ICD-10-CM

## 2021-11-18 PROCEDURE — G8510 SCR DEP NEG, NO PLAN REQD: HCPCS | Performed by: INTERNAL MEDICINE

## 2021-11-18 PROCEDURE — 1101F PT FALLS ASSESS-DOCD LE1/YR: CPT | Performed by: INTERNAL MEDICINE

## 2021-11-18 PROCEDURE — G8536 NO DOC ELDER MAL SCRN: HCPCS | Performed by: INTERNAL MEDICINE

## 2021-11-18 PROCEDURE — G8752 SYS BP LESS 140: HCPCS | Performed by: INTERNAL MEDICINE

## 2021-11-18 PROCEDURE — G8427 DOCREV CUR MEDS BY ELIG CLIN: HCPCS | Performed by: INTERNAL MEDICINE

## 2021-11-18 PROCEDURE — G8419 CALC BMI OUT NRM PARAM NOF/U: HCPCS | Performed by: INTERNAL MEDICINE

## 2021-11-18 PROCEDURE — 99213 OFFICE O/P EST LOW 20 MIN: CPT | Performed by: INTERNAL MEDICINE

## 2021-11-18 PROCEDURE — G8754 DIAS BP LESS 90: HCPCS | Performed by: INTERNAL MEDICINE

## 2021-11-18 RX ORDER — GLUCOSAMINE SULFATE 1500 MG
1000 POWDER IN PACKET (EA) ORAL DAILY
COMMUNITY
End: 2022-08-31

## 2021-11-18 NOTE — PROGRESS NOTES
Reason for Visit:   Wayne Salcido is a 68 y.o. male who is seen for further eval of hypercalcemia and increased psa    Treatment History:   Dx: Hypercalcemia and elevated psa    History of Present Illness:   Doing ok, no major changes    Past Medical History:   Diagnosis Date    Calculus of kidney     Hypercholesterolemia     Hypertension     [facility]     RBBB 2021      Past Surgical History:   Procedure Laterality Date    COLONOSCOPY N/A 2018    COLONOSCOPY performed by Marianne Richards [facility] at [facility] 1827 HX COLONOSCOPY      HX COLONOSCOPY  2018    polyp, repeat in     HX HERNIA REPAIR        Social History     Tobacco Use    Smoking status: Former Smoker     Packs/day: 1.00     Years: 30.00     Pack years: 30.00     Types: Cigarettes     Start date: 1972     Quit date: 2007     Years since quittin.7    Smokeless tobacco: Never Used   Substance Use Topics    Alcohol use: Yes      Family History   Problem Relation Age of Onset    Ovarian Cancer Mother     Heart Disease Father         CAD, s/p valve,  80    Liver Disease Father     Cancer Father 80        liver    Parkinson's Disease Sister      Current Outpatient Medications   Medication Sig    FA/niacinamide/cupric ox/Zn ox (NICOTINAMIDE PO) Take  by mouth daily.  apixaban (ELIQUIS) 5 mg tablet Take 5 mg by mouth two (2) times a day.  dilTIAZem CD (CARDIZEM CD) 180 mg ER capsule Take 1 Cap by mouth daily.  losartan (COZAAR) 100 mg tablet Take 100 mg by mouth daily.  allopurinol (ZYLOPRIM) 300 mg tablet Take 300 mg by mouth daily.  atorvastatin (LIPITOR) 10 mg tablet Take 10 mg by mouth daily.  metoprolol succinate (TOPROL-XL) 200 mg XL tablet Take 200 mg by mouth daily.  cholecalciferol (Vitamin D3) 25 mcg (1,000 unit) cap Take 1,000 Units by mouth daily. No current facility-administered medications for this visit.       Allergies Allergen Reactions    Codeine Rash        Review of Systems: A complete review of systems was obtained, negative except as described above. Physical Exam:     Visit Vitals  /86   Pulse 84   Temp 98.3 °F (36.8 °C) (Oral)   Resp 16   Ht 5' 6.46\" (1.688 m)   Wt 189 lb (85.7 kg)   SpO2 98%   BMI 30.08 kg/m²     ECOG PS:   General: No distress  Eyes: PERRLA, anicteric sclerae  HENT: Atraumatic, OP clear  Neck: Supple  Lymphatic: No cervical, supraclavicular, or inguinal adenopathy  Respiratory: CTAB, normal respiratory effort  CV: Normal rate, regular rhythm, no murmurs, no peripheral edema  GI: Soft, nontender, nondistended, no masses, no hepatomegaly, no splenomegaly  MS: Normal gait and station. Digits without clubbing or cyanosis. Skin: No rashes, ecchymoses, or petechiae. Normal temperature, turgor, and texture. Psych: Alert, oriented, appropriate affect, normal judgment/insight    Results:     Lab Results   Component Value Date/Time    WBC 8.5 12/08/2019 11:02 AM    HGB 17.1 (H) 12/08/2019 11:02 AM    HCT 49.9 12/08/2019 11:02 AM    PLATELET 710 (L) 05/95/0120 11:02 AM    MCV 96.5 12/08/2019 11:02 AM    ABS. NEUTROPHILS 6.1 12/08/2019 11:02 AM     Lab Results   Component Value Date/Time    Sodium 142 11/12/2021 09:07 AM    Potassium 4.3 11/12/2021 09:07 AM    Chloride 105 11/12/2021 09:07 AM    CO2 28 11/12/2021 09:07 AM    Glucose 109 (H) 11/12/2021 09:07 AM    BUN 21 (H) 11/12/2021 09:07 AM    Creatinine 1.22 11/12/2021 09:07 AM    GFR est AA >60 11/12/2021 09:07 AM    GFR est non-AA 58 (L) 11/12/2021 09:07 AM    Calcium 10.0 11/12/2021 09:07 AM     Lab Results   Component Value Date/Time    Bilirubin, total 0.8 12/08/2019 11:02 AM    ALT (SGPT) 45 12/08/2019 11:02 AM    Alk.  phosphatase 59 12/08/2019 11:02 AM    Protein, total 7.3 12/08/2019 11:02 AM    Albumin 4.2 12/08/2019 11:02 AM    Globulin 3.1 12/08/2019 11:02 AM       CT Low Dose Chest 2018  IMPRESSION:   1. 3 mm right middle lobe lung nodule unchanged. This is a 13 month follow-up  examination. 2. Atherosclerosis with mild coronary artery calcification. Lung-RADS Category: 2. Benign behavior or appearance. Management recommendation: Continue annual screening with LDCT in 12 months if  patient meets the criteria for screening. CT Chest low dose 11/16/2021  IMPRESSION  1. Stable 3 mm pulmonary nodule. This is most likely benign. .  2. Moderate to severe emphysema  Lung-RADS Category: 2 S: Nodule with very low likelihood of becoming clinically  significant  Management recommendation: Continue annual screening with low dose CT scan in 12  months. Please see above text. Assessment:   1) Hypercalcemia  2) Elevated PSA      Plan:   1) Check PTH rp, Check 1, 25 D, 25 D, urine calcium for Hyperparathyroid. Rule out lung with low dose CT. 2) Following with Dr Errol Lamas defer any biopsy or treatment to him.    Signed By: Tiana Isidro MD

## 2022-02-21 NOTE — PROGRESS NOTES
Gypsy Rogers. is a 68 y.o. male is here for routine f/u and cardiac clearance for eye procedure. Hx  paroxysmal afib/flutter. 2019--sinus tachy, seen in f/u by Dr. Laurence Donohue with episodes afib/flutter, tachy and started on Metoprolol /d and Eliquis. When seen in  in afib , --no sx  and unaware--Cardizem  added. Additional med hx hypertension, dyslipidemia, DM II (diet). Former tobacco--quit 20 yrs ago, mod ETOH. Today,  He denies chest pain/ shortness of breath, orthopnea, PND, LE edema, palpitations, syncope, presyncope or fatigue. He feels well.      Patient Active Problem List    Diagnosis Date Noted    RBBB 2021    Controlled type 2 diabetes mellitus without complication, without long-term current use of insulin ([facility] 02/10/2020    PAF (paroxysmal atrial fibrillation) ([facility]     Hypertension     Hypercholesterolemia       Tena Tony MD  Past Medical History:   Diagnosis Date    Calculus of kidney     Hypercholesterolemia     Hypertension     PAF (paroxysmal atrial fibrillation) ([facility]     RBBB 2021      Past Surgical History:   Procedure Laterality Date    COLONOSCOPY N/A 2018    COL[facility] at [facility] HX COLONOSCOPY      HX COLONOSCOPY  2018    polyp, repeat in     HX HERNIA REPAIR       Allergies   Allergen Reactions    Codeine Rash      Family History   Problem Relation Age of Onset    Ovarian Cancer Mother     Heart Disease Father         CAD, s/p valve,  80    Liver Disease Father     Cancer Father 80        liver    Parkinson's Disease Sister       Social History     Socioeconomic History    Marital status:      Spouse name: Not on file    Number of children: Not on file    Years of education: Not on file    Highest education level: Not on file   Occupational History    Not on file   Tobacco Use    Smoking status: Former Smoker     Packs/day: 1.00     Years: 30.00     Pack years: 30.00     Types: Cigarettes     Start date: 1972     Quit date: 2007     Years since quittin.9    Smokeless tobacco: Never Used   Vaping Use    Vaping Use: Never used   Substance and Sexual Activity    Alcohol use: Yes    Drug use: Never    Sexual activity: Not on file   Other Topics Concern    Not on file   Social History Narrative    Not on file     Social Determinants of Health     Financial Resource Strain:     Difficulty of Paying Living Expenses: Not on file   Food Insecurity:     Worried About Running Out of Food in the Last Year: Not on file    Abdi of Food in the Last Year: Not on file   Transportation Needs:     Lack of Transportation (Medical): Not on file    Lack of Transportation (Non-Medical): Not on file   Physical Activity:     Days of Exercise per Week: Not on file    Minutes of Exercise per Session: Not on file   Stress:     Feeling of Stress : Not on file   Social Connections:     Frequency of Communication with Friends and Family: Not on file    Frequency of Social Gatherings with Friends and Family: Not on file    Attends Confucianist Services: Not on file    Active Member of [address] [place] [address] [email or web address] or Organizations: Not on file    Attends Club or Organization Meetings: Not on file    Marital Status: Not on file   Intimate Partner Violence:     Fear of Current or Ex-Partner: Not on file    Emotionally Abused: Not on file    Physically Abused: Not on file    Sexually Abused: Not on file   Housing Stability:     Unable to Pay for Housing in the Last Year: Not on file    Number of [place] in the Last Year: Not on file    Unstable Housing in the Last Year: Not on file      Current Outpatient Medications   Medication Sig    FA/niacinamide/cupric ox/Zn ox (NICOTINAMIDE PO) Take  by mouth daily.  apixaban (ELIQUIS) 5 mg tablet Take 5 mg by mouth two (2) times a day.     dilTIAZem CD (CARDIZEM CD) 180 mg ER capsule Take 1 Cap by mouth daily.  losartan (COZAAR) 100 mg tablet Take 100 mg by mouth daily.  allopurinol (ZYLOPRIM) 300 mg tablet Take 300 mg by mouth daily.  atorvastatin (LIPITOR) 10 mg tablet Take 10 mg by mouth daily.  metoprolol succinate (TOPROL-XL) 200 mg XL tablet Take 200 mg by mouth daily.  cholecalciferol (Vitamin D3) 25 mcg (1,000 unit) cap Take 1,000 Units by mouth daily. (Patient not taking: Reported on 2/22/2022)     No current facility-administered medications for this visit. VS obtained in office  98.7 HR 82 /84 O2 sat 99% wegith 196lbs. Review of Symptoms:  11 systems reviewed, negative other than as stated in the HPI    Physical Exam:    General PE  Gen:  NAD  Mental Status - Alert. General Appearance - Not in acute distress. HEENT:  PER, EOM, no JVD  Chest and Lung Exam   Inspection: Accessory muscles - No use of accessory muscles in breathing. No audible wheezing. Normal effort in breathing. Symmetric excursion. Cardiovascular:  No JVD  Upper Extremity: Inspection - Bilateral - No Cyanotic nailbeds or Digital clubbing. Lower Extremity:   Palpation: Edema - Bilateral - No edema. Neuro: A&O times 3, CN and motor grossly WNL  Skin: no rashes or lesions on exposed skin, dry, intact  Psych: normal mood, affect. Speech clear.         Cardiographics    ECG: AF/AFL HR 85  RBBB    Labs:   Lab Results   Component Value Date/Time    Sodium 142 11/12/2021 09:07 AM    Sodium 140 12/08/2019 11:02 AM    Potassium 4.3 11/12/2021 09:07 AM    Potassium 4.1 12/08/2019 11:02 AM    Chloride 105 11/12/2021 09:07 AM    Chloride 103 12/08/2019 11:02 AM    CO2 28 11/12/2021 09:07 AM    CO2 27 12/08/2019 11:02 AM    Anion gap 9 11/12/2021 09:07 AM    Anion gap 10 12/08/2019 11:02 AM    Glucose 109 (H) 11/12/2021 09:07 AM    Glucose 127 (H) 12/08/2019 11:02 AM    BUN 21 (H) 11/12/2021 09:07 AM    BUN 20 12/08/2019 11:02 AM    Creatinine 1.22 11/12/2021 09:07 AM    Creatinine 1.09 12/08/2019 11:02 AM BUN/Creatinine ratio 17 11/12/2021 09:07 AM    BUN/Creatinine ratio 18 12/08/2019 11:02 AM    GFR est AA >60 11/12/2021 09:07 AM    GFR est AA >60 12/08/2019 11:02 AM    GFR est non-AA 58 (L) 11/12/2021 09:07 AM    GFR est non-AA >60 12/08/2019 11:02 AM    Calcium 10.0 11/12/2021 09:07 AM    Calcium 10.2 (H) 12/08/2019 11:02 AM    Bilirubin, total 0.8 12/08/2019 11:02 AM    Alk. phosphatase 59 12/08/2019 11:02 AM    Protein, total 7.3 12/08/2019 11:02 AM    Albumin 4.2 12/08/2019 11:02 AM    Globulin 3.1 12/08/2019 11:02 AM    A-G Ratio 1.4 12/08/2019 11:02 AM    ALT (SGPT) 45 12/08/2019 11:02 AM     No results found for: CPK, CPKX, CPX  No results found for: CHOL, CHOLX, CHLST, CHOLV, 268159, HDL, HDLP, LDL, LDLC, DLDLP, TGLX, TRIGL, TRIGP, CHHD, CHHDX  No results found for this or any previous visit. Assessment:         Patient Active Problem List    Diagnosis Date Noted    RBBB 05/21/2021    Controlled type 2 diabetes mellitus without complication, without long-term current use of insulin ([facility] 02/10/2020    PAF (paroxysmal atrial fibrillation) ([facility]     Hypertension     Hypercholesterolemia         Plan:     Denies adverse cardiac symptoms. Denies palpitations or rapid HR. ECG shows he is back in AF/AFL that is rate controlled. D/W patient referral to Dr Mack Buitrago for further management/treatment options for AF/AFl. He wants to hold off at this time. Cont Eliquis 5mg bid, Metoprolol 200mg every day, and Cardizem. Echo 2/2020 EF 65-70% without valvular disease. NST neg for ischemia. Lipids and labs followed by PCP. Creatinine 1.22 11/12/21. F/u in 6 months. He can be cleared for his eye procedures. Holding Eliquis 48hrs prior to procedure. Resume as soon as stable to do so. Discussed very slight increased risk of CVA d/t AF. He is willing to accept risk and wants to proceed with surgery. Ileana Later, was evaluated through a hybrid synchronous (real-time) audio-video encounter.  The patient (or guardian if applicable) is aware that this is a billable service. Verbal consent to proceed has been obtained within the past 12 months. The visit was conducted pursuant to the emergency declaration under the [address] [place] [address] and the [employer] and [employer] Act. Patient identification was verified, and a caregiver was present when appropriate. The patient was located in our [place] [address] office where a nurse obtained VS and EKG and I was present in our [address] location where I am credentialed to provide care. On this date 2/22/22  I have spent 35 minutes reviewing previous notes, test results and face to face with the patient discussing the diagnosis and importance of compliance with the treatment plan as well as documenting on the day of the visit.     Bucky Nye NP

## 2022-02-22 ENCOUNTER — VIRTUAL VISIT (OUTPATIENT)
Dept: CARDIOLOGY CLINIC | Age: 77
End: 2022-02-22
Payer: MEDICARE

## 2022-02-22 ENCOUNTER — DOCUMENTATION ONLY (OUTPATIENT)
Dept: CARDIOLOGY CLINIC | Age: 77
End: 2022-02-22

## 2022-02-22 DIAGNOSIS — I48.19 PERSISTENT ATRIAL FIBRILLATION (HCC): Primary | ICD-10-CM

## 2022-02-22 DIAGNOSIS — I45.10 RBBB: ICD-10-CM

## 2022-02-22 DIAGNOSIS — E78.00 HYPERCHOLESTEROLEMIA: ICD-10-CM

## 2022-02-22 DIAGNOSIS — I10 ESSENTIAL HYPERTENSION: ICD-10-CM

## 2022-02-22 DIAGNOSIS — E11.9 CONTROLLED TYPE 2 DIABETES MELLITUS WITHOUT COMPLICATION, WITHOUT LONG-TERM CURRENT USE OF INSULIN (HCC): ICD-10-CM

## 2022-02-22 PROCEDURE — 1101F PT FALLS ASSESS-DOCD LE1/YR: CPT | Performed by: NURSE PRACTITIONER

## 2022-02-22 PROCEDURE — G8756 NO BP MEASURE DOC: HCPCS | Performed by: NURSE PRACTITIONER

## 2022-02-22 PROCEDURE — G8419 CALC BMI OUT NRM PARAM NOF/U: HCPCS | Performed by: NURSE PRACTITIONER

## 2022-02-22 PROCEDURE — G8432 DEP SCR NOT DOC, RNG: HCPCS | Performed by: NURSE PRACTITIONER

## 2022-02-22 PROCEDURE — 99214 OFFICE O/P EST MOD 30 MIN: CPT | Performed by: NURSE PRACTITIONER

## 2022-02-22 PROCEDURE — 93000 ELECTROCARDIOGRAM COMPLETE: CPT | Performed by: INTERNAL MEDICINE

## 2022-02-22 PROCEDURE — G8427 DOCREV CUR MEDS BY ELIG CLIN: HCPCS | Performed by: NURSE PRACTITIONER

## 2022-02-22 PROCEDURE — G8536 NO DOC ELDER MAL SCRN: HCPCS | Performed by: NURSE PRACTITIONER

## 2022-02-22 NOTE — PROGRESS NOTES
Identified pt with two pt identifiers(name and ). Reviewed record in preparation for visit and have obtained necessary documentation. Chief Complaint   Patient presents with    Surgical Clearance     cardiac clearance for cataract surgery. DOS: 3/3/2022 & 3/17/2022 w/ Lewanda Bumpers, MD      All vitals were manually obtained in the office. Patient-Reported Systolic (Top) 230   Patient-Reported Diastolic (Bottom) 84   BP Observation    Patient-Reported Pulse 82   Patient-Reported Temperature 98.7F   Patient-Reported Weight 196lb   Patient-Reported Pulse Oximetry 99%Patient-Reported Pulse Oximetry. 99%. The comment is ra. Taken on 22 1019     RECHECK /82    Medications reviewed/approved by provider. Health Maintenance Review: Patient reminded of \"due or due soon\" health maintenance. I have asked the patient to contact his/her primary care provider (PCP) for follow-up on his/her health maintenance. Coordination of Care Questionnaire:  :   1) Have you been to an emergency room, urgent care, or hospitalized since your last visit? If yes, where when, and reason for visit? no       2. Have seen or consulted any other health care provider since your last visit? If yes, where when, and reason for visit? YES- eye MD for cataracts and Dr. Antoni Gonzalez for surgery clearance. Patient is accompanied by self I have received verbal consent from Franck Stock. to discuss any/all medical information while they are present in the room.

## 2022-02-22 NOTE — PROGRESS NOTES
Cardiac clearance note from 2-22-22 and ekg has been faxed to [phone]. Confirmation has been confirmed.

## 2022-03-18 PROBLEM — I45.10 RBBB: Status: ACTIVE | Noted: 2021-05-21

## 2022-03-19 PROBLEM — E11.9 CONTROLLED TYPE 2 DIABETES MELLITUS WITHOUT COMPLICATION, WITHOUT LONG-TERM CURRENT USE OF INSULIN (HCC): Status: ACTIVE | Noted: 2020-02-10

## 2022-08-26 LAB — HBA1C MFR BLD HPLC: 5.7 %

## 2022-08-26 NOTE — PROGRESS NOTES
[address], [place], [address] [place] [address]  [phone]  [address] [place] [address], [address] [place] [address] Way     Subjective: Abdi Castellano is a 68 y.o. male is here for routine f/u. Hx  paroxysmal afib/flutter. 2019--sinus tachy, seen in f/u by Dr. Jose Esteban with episodes afib/flutter, tachy and started on Metoprolol /d and Eliquis. When seen in  in afib , --no sx  and unaware--Cardizem  added. Additional med hx hypertension, dyslipidemia, DM II (diet). Former tobacco--quit 20 yrs ago, mod ETOH. Patient was last seen by Susan Bruce NP via VV in 2022  sought cardiac clearance for eye procedure. Today,   S/p bilateral cataract surgery. No specific CV complaints. He  is on [address] [place] [address], reports no melena, hematuria, or obvious signs of bleeding. No falls. the patient denies chest pain/ shortness of breath, orthopnea, PND, LE edema, palpitations, syncope, or presyncope.     Patient Active Problem List    Diagnosis Date Noted    RBBB 2021    Controlled type 2 diabetes mellitus without complication, without long-term current use of insulin ([address] 02/10/2020    PAF (paroxysmal atrial fibrillation) ([address]     Hypertension     Hypercholesterolemia       Jade Bruce MD  Past Medical History:   Diagnosis Date    Calculus of kidney     Hypercholesterolemia     Hypertension     PAF (paroxysmal atrial fibrillation) ([address]     RBBB 2021      Past Surgical History:   Procedure Laterality Date    COLONOSCOPY N/A 2018    COLONOSCOPY performed by Jorge Grijalva MD at [facility]    HX COLONOSCOPY      HX COLONOSCOPY  2018    polyp, repeat in     HX HERNIA REPAIR       Allergies   Allergen Reactions    Codeine Rash      Family History   Problem Relation Age of Onset    Ovarian Cancer Mother     Heart Disease Father         CAD, s/p valve,  80    Liver Disease Father     Cancer Father 80        liver    Parkinson's Disease Sister Social History     Socioeconomic History    Marital status:      Spouse name: Not on file    Number of children: Not on file    Years of education: Not on file    Highest education level: Not on file   Occupational History    Not on file   Tobacco Use    Smoking status: Former     Packs/day: 1.00     Years: 30.00     Pack years: 30.00     Types: Cigarettes     Start date: 2/27/1972     Quit date: 2/27/2007     Years since quitting: 15.5    Smokeless tobacco: Never   Vaping Use    Vaping Use: Never used   Substance and Sexual Activity    Alcohol use: Yes    Drug use: Never    Sexual activity: Not on file   Other Topics Concern    Not on file   Social History Narrative    Not on file     Social Determinants of Health     Financial Resource Strain: Not on file   Food Insecurity: Not on file   Transportation Needs: Not on file   Physical Activity: Not on file   Stress: Not on file   Social Connections: Not on file   Intimate Partner Violence: Not on file   Housing Stability: Not on file      Current Outpatient Medications   Medication Sig    cholecalciferol (Vitamin D3) 25 mcg (1,000 unit) cap Take 1,000 Units by mouth daily. (Patient not taking: Reported on 2/22/2022)    FA/niacinamide/cupric ox/Zn ox (NICOTINAMIDE PO) Take  by mouth daily. apixaban (ELIQUIS) 5 mg tablet Take 5 mg by mouth two (2) times a day. dilTIAZem CD (CARDIZEM CD) 180 mg ER capsule Take 1 Cap by mouth daily. losartan (COZAAR) 100 mg tablet Take 100 mg by mouth daily. allopurinol (ZYLOPRIM) 300 mg tablet Take 300 mg by mouth daily. atorvastatin (LIPITOR) 10 mg tablet Take 10 mg by mouth daily. metoprolol succinate (TOPROL-XL) 200 mg XL tablet Take 200 mg by mouth daily. No current facility-administered medications for this visit. Review of Symptoms:  11 systems reviewed, negative other than as stated in the HPI    Physical ExamPhysical Exam:    There were no vitals filed for this visit.   There is no height or weight on file to calculate BMI. General PE  Gen:  NAD  Mental Status - Alert. General Appearance - Not in acute distress. HEENT:  PERRL, no carotid bruits or JVD  Chest and Lung Exam   Inspection: Accessory muscles - No use of accessory muscles in breathing. Auscultation:   Breath sounds: - Normal.   Cardiovascular   Inspection: Jugular vein - Bilateral - Inspection Normal.   Palpation/Percussion:   Apical Impulse: - Normal.   Auscultation: Rhythm - Regular. Heart Sounds - S1 WNL and S2 WNL. No S3 or S4. Murmurs & Other Heart Sounds: Auscultation of the heart reveals - No Murmurs. Peripheral Vascular   Upper Extremity: Inspection - Bilateral - No Cyanotic nailbeds or Digital clubbing. Lower Extremity:   Palpation: Edema - Bilateral - No edema. Abdomen:   Soft, non-tender, bowel sounds are active. Neuro: A&O times 3, CN and motor grossly WNL    Labs:   No results found for: CHOL, CHOLX, CHLST, CHOLV, 962737, HDL, HDLP, LDL, LDLC, DLDLP, TGLX, TRIGL, TRIGP, CHHD, CHHDX  No results found for: CPK, CPKX, CPX  Lab Results   Component Value Date/Time    Sodium 142 11/12/2021 09:07 AM    Potassium 4.3 11/12/2021 09:07 AM    Chloride 105 11/12/2021 09:07 AM    CO2 28 11/12/2021 09:07 AM    Anion gap 9 11/12/2021 09:07 AM    Glucose 109 (H) 11/12/2021 09:07 AM    BUN 21 (H) 11/12/2021 09:07 AM    Creatinine 1.22 11/12/2021 09:07 AM    BUN/Creatinine ratio 17 11/12/2021 09:07 AM    GFR est AA >60 11/12/2021 09:07 AM    GFR est non-AA 58 (L) 11/12/2021 09:07 AM    Calcium 10.0 11/12/2021 09:07 AM    Bilirubin, total 0.8 12/08/2019 11:02 AM    Alk. phosphatase 59 12/08/2019 11:02 AM    Protein, total 7.3 12/08/2019 11:02 AM    Albumin 4.2 12/08/2019 11:02 AM    Globulin 3.1 12/08/2019 11:02 AM    A-G Ratio 1.4 12/08/2019 11:02 AM    ALT (SGPT) 45 12/08/2019 11:02 AM       EKG:  NSR RBBB       Assessment:          ICD-10-CM ICD-9-CM    1. Persistent atrial fibrillation (HCC)  I48.19 427.31       2.  Essential hypertension  I10 401.9       3. Hypercholesterolemia  E78.00 272.0       4. RBBB  I45.10 426.4       5. PAC (premature atrial contraction)  I49.1 427.61       6. Controlled type 2 diabetes mellitus without complication, without long-term current use of insulin (HCC)  E11.9 250.00       7. Family history of ischemic heart disease  Z82.49 V17.3           No orders of the defined types were placed in this encounter. 02/18/20    ECHO ADULT COMPLETE 02/19/2020 2/19/2020    Interpretation Summary  · Normal cavity size, wall thickness and systolic function (ejection fraction normal). Estimated left ventricular ejection fraction is 65 - 70%. Visually measured ejection fraction. Inconclusive left ventricular diastolic function. · Mild aortic valve focal thickening present. · Trace mitral valve regurgitation is present. · Mild pulmonary hypertension. Signed by: Leidy Guerrero MD on 2/19/2020  7:36 AM         Plan:       Chronic AF  LVEF 65% valves ok per echo in 2/18/2020  In 2/20 paroxysmal afib/flutter. December 2019--sinus tachy, seen in f/u by Dr. Galeano Hence with episodes afib/flutter, tachy and started on Metoprolol /d and Eliquis. When seen in 2/20 in afib , --no sx  and unaware--Cardizem  added. Continue BB, Dilt  Continue Eliquis 5 mg BID  Will request labs    HTN  Controlled with current therapy    HLD  On statin Labs and lipids per PCP    Diet Controlled DM  Followed by PCP  Continue low carb diet    Discussed importance of diet and exercise - eventual goal of 30 - 60 minutes, 5-7 times a week as per AHA guideline. Goal of 10 % (18 lbs) weight loss for 6 months. Patient was made aware during visit today that all testing completed would be instantaneously available on their MyChart for review. Discussed that these results will be made available to the provider at the same time.   They were advised to wait at least 3 business days to allow for provider's interpretation of results with follow-up before calling our office with concerns about their results. Continue current care and f/u in 6 months.         Michaela Slater NP

## 2022-08-31 ENCOUNTER — OFFICE VISIT (OUTPATIENT)
Dept: CARDIOLOGY CLINIC | Age: 77
End: 2022-08-31
Payer: MEDICARE

## 2022-08-31 VITALS
HEART RATE: 87 BPM | BODY MASS INDEX: 31.18 KG/M2 | RESPIRATION RATE: 18 BRPM | HEIGHT: 66 IN | WEIGHT: 194 LBS | OXYGEN SATURATION: 97 % | SYSTOLIC BLOOD PRESSURE: 140 MMHG | TEMPERATURE: 98.6 F | DIASTOLIC BLOOD PRESSURE: 84 MMHG

## 2022-08-31 DIAGNOSIS — Z82.49 FAMILY HISTORY OF ISCHEMIC HEART DISEASE: ICD-10-CM

## 2022-08-31 DIAGNOSIS — I49.1 PAC (PREMATURE ATRIAL CONTRACTION): ICD-10-CM

## 2022-08-31 DIAGNOSIS — I45.10 RBBB: ICD-10-CM

## 2022-08-31 DIAGNOSIS — I48.19 PERSISTENT ATRIAL FIBRILLATION (HCC): Primary | ICD-10-CM

## 2022-08-31 DIAGNOSIS — E11.9 CONTROLLED TYPE 2 DIABETES MELLITUS WITHOUT COMPLICATION, WITHOUT LONG-TERM CURRENT USE OF INSULIN (HCC): ICD-10-CM

## 2022-08-31 DIAGNOSIS — I10 ESSENTIAL HYPERTENSION: ICD-10-CM

## 2022-08-31 DIAGNOSIS — E78.00 HYPERCHOLESTEROLEMIA: ICD-10-CM

## 2022-08-31 PROCEDURE — G8417 CALC BMI ABV UP PARAM F/U: HCPCS | Performed by: NURSE PRACTITIONER

## 2022-08-31 PROCEDURE — 99214 OFFICE O/P EST MOD 30 MIN: CPT | Performed by: NURSE PRACTITIONER

## 2022-08-31 PROCEDURE — 1123F ACP DISCUSS/DSCN MKR DOCD: CPT | Performed by: NURSE PRACTITIONER

## 2022-08-31 PROCEDURE — G8754 DIAS BP LESS 90: HCPCS | Performed by: NURSE PRACTITIONER

## 2022-08-31 PROCEDURE — G8536 NO DOC ELDER MAL SCRN: HCPCS | Performed by: NURSE PRACTITIONER

## 2022-08-31 PROCEDURE — G8753 SYS BP > OR = 140: HCPCS | Performed by: NURSE PRACTITIONER

## 2022-08-31 PROCEDURE — 1101F PT FALLS ASSESS-DOCD LE1/YR: CPT | Performed by: NURSE PRACTITIONER

## 2022-08-31 PROCEDURE — G8432 DEP SCR NOT DOC, RNG: HCPCS | Performed by: NURSE PRACTITIONER

## 2022-08-31 PROCEDURE — 93000 ELECTROCARDIOGRAM COMPLETE: CPT | Performed by: NURSE PRACTITIONER

## 2022-08-31 PROCEDURE — G8427 DOCREV CUR MEDS BY ELIG CLIN: HCPCS | Performed by: NURSE PRACTITIONER

## 2022-08-31 NOTE — PROGRESS NOTES
Identified pt with two pt identifiers(name and ). Reviewed record in preparation for visit and have obtained necessary documentation. Chief Complaint   Patient presents with    Irregular Heart Beat     Follow up - pt denies cardiac sx's. Hypertension      Vitals:    22 1443   BP: (!) 140/84   Pulse: 87   Resp: 18   Temp: 98.6 °F (37 °C)   TempSrc: Temporal   SpO2: 97%   Weight: 194 lb (88 kg)   Height: 5' 6.46\" (1.688 m)   PainSc:   0 - No pain       Medications reviewed/approved by provider. Health Maintenance Review: Patient reminded of \"due or due soon\" health maintenance. I have asked the patient to contact his/her primary care provider (PCP) for follow-up on his/her health maintenance. Coordination of Care Questionnaire:  :   1) Have you been to an emergency room, urgent care, or hospitalized since your last visit? If yes, where when, and reason for visit? no       2. Have seen or consulted any other health care provider since your last visit? If yes, where when, and reason for visit? YES - PCP Edith Moore Self for primary care. Patient is accompanied by self I have received verbal consent from Damon Zimmerman. to discuss any/all medical information while they are present in the room.

## 2023-03-03 NOTE — PROGRESS NOTES
[address], [facility], [address]  [phone]  [address] [place] [address], [facility]     Subjective: Colleen Guo is a 68 y.o. male is here for routine f/u. Hx  paroxysmal afib/flutter. 2019--sinus tachy, seen in f/u by Dr. Tomy Llanes with episodes afib/flutter, tachy and started on Metoprolol /d and Eliquis. When seen in  in afib , --no sx  and unaware--Cardizem  added. Additional med hx hypertension, dyslipidemia, DM II (diet). Former tobacco--quit 20 yrs ago, mod ETOH. Patient was last seen by Matt Orlando NP via VV in 2022  sought cardiac clearance for eye procedure. Last seen by me in 2022----S/p bilateral cataract surgery. Today    No specific CV complaints. He  is on [facility], reports no melena, hematuria, or obvious signs of bleeding. No falls. He has bruising from [facility]    the patient denies chest pain/ shortness of breath, orthopnea, PND, LE edema, palpitations, syncope, or presyncope.     Patient Active Problem List    Diagnosis Date Noted    RBBB 2021    Controlled type 2 diabetes mellitus without complication, without long-term current use of insulin ([address] 02/10/2020    PAF (paroxysmal atrial fibrillation) ([address]     Hypertension     Hypercholesterolemia       Emeka Miles MD  Past Medical History:   Diagnosis Date    Calculus of kidney     Hypercholesterolemia     Hypertension     PAF (paroxysmal atrial fibrillation) ([address]     RBBB 2021      Past Surgical History:   Procedure Laterality Date    COLONOSCOPY N/A 2018    COLONOSCOPY performed by Angel Kaiser MD at [facility]    HX COLONOSCOPY      HX COLONOSCOPY  2018    polyp, repeat in     HX HERNIA REPAIR       Allergies   Allergen Reactions    Codeine Rash      Family History   Problem Relation Age of Onset    Ovarian Cancer Mother     Heart Disease Father         CAD, s/p valve,  80    Liver Disease Father     Cancer Father 80        liver    Parkinson's Disease Sister       Social History     Socioeconomic History    Marital status:      Spouse name: Not on file    Number of children: Not on file    Years of education: Not on file    Highest education level: Not on file   Occupational History    Not on file   Tobacco Use    Smoking status: Former     Packs/day: 1.00     Years: 30.00     Pack years: 30.00     Types: Cigarettes     Start date: 1972     Quit date: 2007     Years since quittin.0    Smokeless tobacco: Never   Vaping Use    Vaping Use: Never used   Substance and Sexual Activity    Alcohol use: Yes    Drug use: Never    Sexual activity: Not on file   Other Topics Concern    Not on file   Social History Narrative    Not on file     Social Determinants of Health     Financial Resource Strain: Not on file   Food Insecurity: Not on file   Transportation Needs: Not on file   Physical Activity: Not on file   Stress: Not on file   Social Connections: Not on file   Intimate Partner Violence: Not on file   Housing Stability: Not on file      Current Outpatient Medications   Medication Sig    FA/niacinamide/cupric ox/Zn ox (NICOTINAMIDE PO) Take  by mouth daily. apixaban (ELIQUIS) 5 mg tablet Take 5 mg by mouth two (2) times a day. dilTIAZem CD (CARDIZEM CD) 180 mg ER capsule Take 1 Cap by mouth daily. losartan (COZAAR) 100 mg tablet Take 100 mg by mouth daily. allopurinol (ZYLOPRIM) 300 mg tablet Take 300 mg by mouth daily. atorvastatin (LIPITOR) 10 mg tablet Take 10 mg by mouth daily. metoprolol succinate (TOPROL-XL) 200 mg XL tablet Take 200 mg by mouth daily. No current facility-administered medications for this visit.          Review of Symptoms:  11 systems reviewed, negative other than as stated in the HPI    Physical ExamPhysical Exam:    Vitals:    23 1115   BP: 130/80   Pulse: 71   Resp: 16   Temp: 98.6 °F (37 °C)   TempSrc: Temporal   SpO2: 97%   Weight: 191 lb (86.6 kg)   Height: 5' 6.46\" (1.688 m)     Body mass index is 30.4 kg/m². General PE  Gen:  NAD  Mental Status - Alert. General Appearance - Not in acute distress. HEENT:  PERRL, no carotid bruits or JVD  Chest and Lung Exam   Inspection: Accessory muscles - No use of accessory muscles in breathing. Auscultation:   Breath sounds: - Normal.   Cardiovascular   Inspection: Jugular vein - Bilateral - Inspection Normal.   Palpation/Percussion:   Apical Impulse: - Normal.   Auscultation: Rhythm - Regular. Heart Sounds - S1 WNL and S2 WNL. No S3 or S4. Murmurs & Other Heart Sounds: Auscultation of the heart reveals - No Murmurs. Peripheral Vascular   Upper Extremity: Inspection - Bilateral - No Cyanotic nailbeds or Digital clubbing. Lower Extremity:   Palpation: Edema - Bilateral - No edema. Abdomen:   Soft, non-tender, bowel sounds are active. Neuro: A&O times 3, CN and motor grossly WNL    Labs:   No results found for: CHOL, CHOLX, CHLST, CHOLV, 637012, HDL, HDLP, LDL, LDLC, DLDLP, TGLX, TRIGL, TRIGP, CHHD, CHHDX  No results found for: CPK, CPKX, CPX  Lab Results   Component Value Date/Time    Sodium 142 11/12/2021 09:07 AM    Potassium 4.3 11/12/2021 09:07 AM    Chloride 105 11/12/2021 09:07 AM    CO2 28 11/12/2021 09:07 AM    Anion gap 9 11/12/2021 09:07 AM    Glucose 109 (H) 11/12/2021 09:07 AM    BUN 21 (H) 11/12/2021 09:07 AM    Creatinine 1.22 11/12/2021 09:07 AM    BUN/Creatinine ratio 17 11/12/2021 09:07 AM    GFR est AA >60 11/12/2021 09:07 AM    GFR est non-AA 58 (L) 11/12/2021 09:07 AM    Calcium 10.0 11/12/2021 09:07 AM    Bilirubin, total 0.8 12/08/2019 11:02 AM    Alk. phosphatase 59 12/08/2019 11:02 AM    Protein, total 7.3 12/08/2019 11:02 AM    Albumin 4.2 12/08/2019 11:02 AM    Globulin 3.1 12/08/2019 11:02 AM    A-G Ratio 1.4 12/08/2019 11:02 AM    ALT (SGPT) 45 12/08/2019 11:02 AM       EKG:  SR RBBB       Assessment:          ICD-10-CM ICD-9-CM    1.  Persistent atrial fibrillation ([address] I48.19 427.31 AMB POC EKG ROUTINE W/ 12 LEADS, INTER & REP      2. Essential hypertension  I10 401.9 AMB POC EKG ROUTINE W/ 12 LEADS, INTER & REP      3. Hypercholesterolemia  E78.00 272.0 AMB POC EKG ROUTINE W/ 12 LEADS, INTER & REP      4. RBBB  I45.10 426.4 AMB POC EKG ROUTINE W/ 12 LEADS, INTER & REP      5. Family history of ischemic heart disease  Z82.49 V17.3 AMB POC EKG ROUTINE W/ 12 LEADS, INTER & REP      6. PAC (premature atrial contraction)  I49.1 427.61 AMB POC EKG ROUTINE W/ 12 LEADS, INTER & REP      7. Controlled type 2 diabetes mellitus without complication, without long-term current use of insulin (HCC)  E11.9 250.00 AMB POC EKG ROUTINE W/ 12 LEADS, INTER & REP          Orders Placed This Encounter    AMB POC EKG ROUTINE W/ 12 LEADS, INTER & REP     Order Specific Question:   Reason for Exam:     Answer:   afib       02/18/20    ECHO ADULT COMPLETE 02/19/2020 2/19/2020    Interpretation Summary  · Normal cavity size, wall thickness and systolic function (ejection fraction normal). Estimated left ventricular ejection fraction is 65 - 70%. Visually measured ejection fraction. Inconclusive left ventricular diastolic function. · Mild aortic valve focal thickening present. · Trace mitral valve regurgitation is present. · Mild pulmonary hypertension. Signed by: Ashley Lopez MD on 2/19/2020  7:36 AM         Plan:       Chronic AF  LVEF 65% valves ok per echo in 2/18/2020  In 2/20 paroxysmal afib/flutter. December 2019--sinus tachy, seen in f/u by Dr. Chencho Montgomery with episodes afib/flutter, tachy and started on Metoprolol /d and Eliquis. When seen in 2/20 in afib , --no sx  and unaware--Cardizem  added. Continue BB, Dilt  Continue Eliquis 5 mg BID  Last Hgb 16.5 in 1/2023  Discussed Watchman device with pt; will think about it.   Ok to continue [facility] for now  Will repeat echo when Dr Vish Regna is here, pt preference     HTN  Controlled with current therapy  Serum Cr 1.15 in 1/2023    HLD  1/2023 LDL 74 On statin Labs and lipids per PCP    Diet Controlled DM  Followed by PCP  Continue low carb diet    Discussed importance of diet and exercise - eventual goal of 30 - 60 minutes, 5-7 times a week as per AHA guideline. Goal of 10 % (18 lbs) weight loss for 6 months. Patient was made aware during visit today that all testing completed would be instantaneously available on their MyChart for review. Discussed that these results will be made available to the provider at the same time. They were advised to wait at least 3 business days to allow for provider's interpretation of results with follow-up before calling our office with concerns about their results.     Continue current care and f/u in 6 months with Dr Batsheva Dawn, NP

## 2023-03-08 ENCOUNTER — OFFICE VISIT (OUTPATIENT)
Dept: CARDIOLOGY CLINIC | Age: 78
End: 2023-03-08
Payer: MEDICARE

## 2023-03-08 VITALS
DIASTOLIC BLOOD PRESSURE: 80 MMHG | WEIGHT: 191 LBS | HEIGHT: 66 IN | BODY MASS INDEX: 30.7 KG/M2 | OXYGEN SATURATION: 97 % | TEMPERATURE: 98.6 F | SYSTOLIC BLOOD PRESSURE: 130 MMHG | HEART RATE: 71 BPM | RESPIRATION RATE: 16 BRPM

## 2023-03-08 DIAGNOSIS — I10 ESSENTIAL HYPERTENSION: ICD-10-CM

## 2023-03-08 DIAGNOSIS — E11.9 CONTROLLED TYPE 2 DIABETES MELLITUS WITHOUT COMPLICATION, WITHOUT LONG-TERM CURRENT USE OF INSULIN (HCC): ICD-10-CM

## 2023-03-08 DIAGNOSIS — I45.10 RBBB: ICD-10-CM

## 2023-03-08 DIAGNOSIS — I48.19 PERSISTENT ATRIAL FIBRILLATION (HCC): Primary | ICD-10-CM

## 2023-03-08 DIAGNOSIS — E78.00 HYPERCHOLESTEROLEMIA: ICD-10-CM

## 2023-03-08 DIAGNOSIS — I49.1 PAC (PREMATURE ATRIAL CONTRACTION): ICD-10-CM

## 2023-03-08 DIAGNOSIS — Z82.49 FAMILY HISTORY OF ISCHEMIC HEART DISEASE: ICD-10-CM

## 2023-03-08 PROCEDURE — 1101F PT FALLS ASSESS-DOCD LE1/YR: CPT | Performed by: NURSE PRACTITIONER

## 2023-03-08 PROCEDURE — 99214 OFFICE O/P EST MOD 30 MIN: CPT | Performed by: NURSE PRACTITIONER

## 2023-03-08 PROCEDURE — G8432 DEP SCR NOT DOC, RNG: HCPCS | Performed by: NURSE PRACTITIONER

## 2023-03-08 PROCEDURE — G8417 CALC BMI ABV UP PARAM F/U: HCPCS | Performed by: NURSE PRACTITIONER

## 2023-03-08 PROCEDURE — G8536 NO DOC ELDER MAL SCRN: HCPCS | Performed by: NURSE PRACTITIONER

## 2023-03-08 PROCEDURE — 93000 ELECTROCARDIOGRAM COMPLETE: CPT | Performed by: NURSE PRACTITIONER

## 2023-03-08 PROCEDURE — 3075F SYST BP GE 130 - 139MM HG: CPT | Performed by: NURSE PRACTITIONER

## 2023-03-08 PROCEDURE — G8427 DOCREV CUR MEDS BY ELIG CLIN: HCPCS | Performed by: NURSE PRACTITIONER

## 2023-03-08 PROCEDURE — 1123F ACP DISCUSS/DSCN MKR DOCD: CPT | Performed by: NURSE PRACTITIONER

## 2023-03-08 PROCEDURE — 3079F DIAST BP 80-89 MM HG: CPT | Performed by: NURSE PRACTITIONER

## 2023-03-08 NOTE — PROGRESS NOTES
Identified pt with two pt identifiers(name and ). Reviewed record in preparation for visit and have obtained necessary documentation. Chief Complaint   Patient presents with    Irregular Heart Beat     6 month follow up    Hypertension    Cholesterol Problem      Vitals:    23 1115   BP: 130/80   Pulse: 71   Resp: 16   Temp: 98.6 °F (37 °C)   TempSrc: Temporal   SpO2: 97%   Weight: 191 lb (86.6 kg)   Height: 5' 6.46\" (1.688 m)   PainSc:   0 - No pain       Medications reviewed/approved by provider. Health Maintenance Review: Patient reminded of \"due or due soon\" health maintenance. I have asked the patient to contact his/her primary care provider (PCP) for follow-up on his/her health maintenance. Coordination of Care Questionnaire:  :   1) Have you been to an emergency room, urgent care, or hospitalized since your last visit? If yes, where when, and reason for visit? no       2. Have seen or consulted any other health care provider since your last visit? If yes, where when, and reason for visit? YES - dr. Yumiko Lee for r/c. Patient is accompanied by self I have received verbal consent from Karla Grimm. to discuss any/all medical information while they are present in the room.

## 2023-04-28 ENCOUNTER — OFFICE VISIT (OUTPATIENT)
Dept: SURGERY | Age: 78
End: 2023-04-28

## 2023-04-28 VITALS
HEART RATE: 78 BPM | TEMPERATURE: 98 F | OXYGEN SATURATION: 97 % | SYSTOLIC BLOOD PRESSURE: 137 MMHG | BODY MASS INDEX: 28.14 KG/M2 | HEIGHT: 71 IN | RESPIRATION RATE: 16 BRPM | DIASTOLIC BLOOD PRESSURE: 71 MMHG | WEIGHT: 201 LBS

## 2023-04-28 DIAGNOSIS — I48.0 PAF (PAROXYSMAL ATRIAL FIBRILLATION) (HCC): ICD-10-CM

## 2023-04-28 DIAGNOSIS — Z86.010 HX OF COLONIC POLYPS: Primary | ICD-10-CM

## 2023-04-28 NOTE — PATIENT INSTRUCTIONS
Learning About Colonoscopy  What is a colonoscopy? A colonoscopy is a test (also called a procedure) that lets a doctor look inside your large intestine. The doctor uses a thin, lighted tube called a colonoscope. The doctor uses it to look for small growths called polyps, colon or rectal cancer (colorectal cancer), or other problems like bleeding. During the procedure, the doctor can take samples of tissue. The samples can then be checked for cancer or other conditions. The doctor can also take out polyps. How is a colonoscopy done? This procedure is done in a doctor's office or a clinic or hospital. You will get medicine to help you relax and not feel pain. Some people find that they don't remember having the test because of the medicine. The doctor gently moves the colonoscope, or scope, through the colon. The scope is also a small video camera. It lets the doctor see the colon and take pictures. How do you prepare for the procedure? You need to clean out your colon before the procedure so the doctor can see your colon. This depends on which \"colon prep\" your doctor recommends. To clean out your colon, you'll do a \"colon prep\" before the test. This means you stop eating solid foods and drink only clear liquids. You can have water, tea, coffee, clear juices, clear broths, flavored ice pops, and gelatin (such as Jell-O). Do not drink anything red or purple. The day or night before the procedure, you drink a large amount of a special liquid. This causes loose, frequent stools. You will go to the bathroom a lot. Your doctor may have you drink part of the liquid the evening before and the rest on the day of the test. It's very important to drink all of the liquid. If you have problems drinking it, call your doctor. Arrange to have someone take you home after the test.  What can you expect after a colonoscopy? Your doctor will tell you when you can eat and do your usual activities.   Drink a lot of fluid after the test to replace the fluids you may have lost during the colon prep. But don't drink alcohol. Your doctor will talk to you about when you'll need your next colonoscopy. The results of your test and your risk for colorectal cancer will help your doctor decide how often you need to be checked. After the test, you may be bloated or have gas pains. You may need to pass gas. If a biopsy was done or a polyp was removed, you may have streaks of blood in your stool (feces) for a few days. Check with your doctor to see when it is safe to take aspirin and nonsteroidal anti-inflammatory drugs (NSAIDs) again. Problems such as heavy rectal bleeding may not occur until several weeks after the test. This isn't common. But it can happen after polyps are removed. Follow-up care is a key part of your treatment and safety. Be sure to make and go to all appointments, and call your doctor if you are having problems. It's also a good idea to know your test results and keep a list of the medicines you take. Where can you learn more? Go to http://www.gray.com/  Enter Z368 in the search box to learn more about \"Learning About Colonoscopy. \"  Current as of: May 4, 2022               Content Version: 13.6  © 2006-2023 [employer]wise[employer]. Care instructions adapted under license by [employer] (which disclaims liability or warranty for this information). If you have questions about a medical condition or this instruction, always ask your healthcare professional. [place] [address] any warranty or liability for your use of this information.

## 2023-04-28 NOTE — PROGRESS NOTES
Lupillo Atwood is a 68 y.o. male who presents today with the following:  Chief Complaint   Patient presents with    Colon Cancer Screening       HPI    77-year-old male patient of Dr. Pernell Reyna who presents for possible surveillance colonoscopy. His last colonoscopy was in May 2018 by Dr. Ethan Weaver.  Findings of a descending colon polyp that was a tubular adenoma as well as diverticulosis were identified at that time. He has no family history of colon cancer. He denies any personal history of melena or hematochezia or diarrhea or constipation. He has had no abdominal pain or unexpected weight loss. He has had no change in the caliber of his stool and no recent stool testing. He has had a right inguinal hernia repair as well as a percutaneous nephrostomy for nephrolithiasis. He has a history of A-fib for which he is on Eliquis and also has a history of hypertension. He stopped tobacco use 21 years ago. He drinks 2 to 4 ounces of bourbon daily. He has been vaccinated for COVID.   Past Medical History:   Diagnosis Date    Calculus of kidney     Hypercholesterolemia     Hypertension     PAF (paroxysmal atrial fibrillation) ([facility] [address]     RBBB 2021       Past Surgical History:   Procedure Laterality Date    COLONOSCOPY N/A 2018    COLONOSCOPY performed by Kalina Jett MD at [facility]    HX COLONOSCOPY      HX COLONOSCOPY  2018    polyp, repeat in     HX HERNIA REPAIR         Social History     Socioeconomic History    Marital status:      Spouse name: Not on file    Number of children: Not on file    Years of education: Not on file    Highest education level: Not on file   Occupational History    Not on file   Tobacco Use    Smoking status: Former     Packs/day: 1.00     Years: 30.00     Pack years: 30.00     Types: Cigarettes     Start date: 1972     Quit date: 2007     Years since quittin.1    Smokeless tobacco: Never   Vaping Use    Vaping Use: Never used Substance and Sexual Activity    Alcohol use: Yes    Drug use: Never    Sexual activity: Not on file   Other Topics Concern    Not on file   Social History Narrative    Not on file     Social Determinants of Health     Financial Resource Strain: Not on file   Food Insecurity: Not on file   Transportation Needs: Not on file   Physical Activity: Not on file   Stress: Not on file   Social Connections: Not on file   Intimate Partner Violence: Not on file   Housing Stability: Not on file       Family History   Problem Relation Age of Onset    Ovarian Cancer Mother     Heart Disease Father         CAD, s/p valve,  80    Liver Disease Father     Cancer Father 80        liver    Parkinson's Disease Sister        Allergies   Allergen Reactions    Codeine Rash       Current Outpatient Medications   Medication Sig    FA/niacinamide/cupric ox/Zn ox (NICOTINAMIDE PO) Take  by mouth daily. apixaban (ELIQUIS) 5 mg tablet Take 5 mg by mouth two (2) times a day. dilTIAZem CD (CARDIZEM CD) 180 mg ER capsule Take 1 Cap by mouth daily. losartan (COZAAR) 100 mg tablet Take 100 mg by mouth daily. allopurinol (ZYLOPRIM) 300 mg tablet Take 300 mg by mouth daily. atorvastatin (LIPITOR) 10 mg tablet Take 10 mg by mouth daily. metoprolol succinate (TOPROL-XL) 200 mg XL tablet Take 200 mg by mouth daily. No current facility-administered medications for this visit. The above histories, medications and allergies have been reviewed. ROS    Visit Vitals  /71 (BP 1 Location: Left upper arm, BP Patient Position: Sitting, BP Cuff Size: Adult)   Pulse 78   Temp 98 °F (36.7 °C) (Temporal)   Resp 16   Ht 5' 11\" (1.803 m)   Wt 201 lb (91.2 kg)   SpO2 97%   BMI 28.03 kg/m²     Physical Exam  Constitutional:       Appearance: Normal appearance. Cardiovascular:      Rate and Rhythm: Rhythm irregular. Pulmonary:      Breath sounds: Normal breath sounds. Abdominal:      General: There is no distension. Palpations: Abdomen is soft. There is no mass. Tenderness: There is no abdominal tenderness. Skin:     Comments: Multiple areas of ecchymosis   Neurological:      Mental Status: He is alert. 1. Hx of colonic polyps  Recommend colonoscopy. The procedure was explained in detail including the risks and benefits. Risks shared included risks of missed lesions, incomplete exam, colon injury or perforation. Risks associated with anesthesia were also discussed. The patient wishes to proceed and we will schedule. The patient was counseled at length about the risks of mansi Covid-19 during their perioperative period and any recovery window from their procedure. The patient was made aware that mansi Covid-19  may worsen their prognosis for recovering from their procedure and lend to a higher morbidity and/or mortality risk. All material risks, benefits, and reasonable alternatives including postponing the procedure were discussed. The patient does  wish to proceed with the procedure at this time. 2. PAF (paroxysmal atrial fibrillation) (HCC)  We will get the okay from Dr. Adriane Anderson to hold the Eliquis. Follow-up and Dispositions    Return for post procedure.          Benson Thomas MD

## 2023-04-28 NOTE — PROGRESS NOTES
Identified pt with two pt identifiers (name and ). Reviewed chart in preparation for visit and have obtained necessary documentation. Garo Cazares is a 68 y.o. male  Chief Complaint   Patient presents with    Colon Cancer Screening     There were no vitals taken for this visit. 1. Have you been to the ER, urgent care clinic since your last visit? Hospitalized since your last visit? No    2. Have you seen or consulted any other health care providers outside of the [address] [place] [address] since your last visit? Include any pap smears or colon screening.  Yes Where: [facility]

## 2023-06-05 ENCOUNTER — ANESTHESIA EVENT (OUTPATIENT)
Facility: HOSPITAL | Age: 78
End: 2023-06-05
Payer: MEDICARE

## 2023-06-05 ASSESSMENT — LIFESTYLE VARIABLES: SMOKING_STATUS: 0

## 2023-06-07 ENCOUNTER — PREP FOR PROCEDURE (OUTPATIENT)
Age: 78
End: 2023-06-07

## 2023-06-07 RX ORDER — SODIUM CHLORIDE, SODIUM LACTATE, POTASSIUM CHLORIDE, CALCIUM CHLORIDE 600; 310; 30; 20 MG/100ML; MG/100ML; MG/100ML; MG/100ML
INJECTION, SOLUTION INTRAVENOUS CONTINUOUS
Status: CANCELLED | OUTPATIENT
Start: 2023-06-07

## 2023-06-07 RX ORDER — SODIUM CHLORIDE 9 MG/ML
INJECTION, SOLUTION INTRAVENOUS PRN
Status: CANCELLED | OUTPATIENT
Start: 2023-06-07

## 2023-06-07 RX ORDER — SODIUM CHLORIDE 0.9 % (FLUSH) 0.9 %
5-40 SYRINGE (ML) INJECTION PRN
Status: CANCELLED | OUTPATIENT
Start: 2023-06-07

## 2023-06-07 RX ORDER — SODIUM CHLORIDE 0.9 % (FLUSH) 0.9 %
5-40 SYRINGE (ML) INJECTION EVERY 12 HOURS SCHEDULED
Status: CANCELLED | OUTPATIENT
Start: 2023-06-07

## 2023-06-07 NOTE — H&P
HPI      66-year-old male patient of Dr. Audrey Richards who presents for possible surveillance colonoscopy. His last colonoscopy was in May 2018 by Dr. Chao Fry.  Findings of a descending colon polyp that was a tubular adenoma as well as diverticulosis were identified  at that time. He has no family history of colon cancer. He denies any personal history of melena or hematochezia or diarrhea or constipation. He has had no abdominal pain or unexpected weight loss. He has had no change in the caliber of his stool  and no recent stool testing. He has had a right inguinal hernia repair as well as a percutaneous nephrostomy for nephrolithiasis. He has a history of A-fib for which he is on Eliquis and also has a history of hypertension. He stopped tobacco use 21 years ago. He drinks 2 to 4 ounces of bourbon daily. He has been vaccinated for COVID. Past Medical History:       Diagnosis                                   Past Surgical History:        Procedure                                                            Social History            Socioeconomic History                                          Tobacco Use                                   Vaping Use              Substance and Sexual Activity                   Other Topics             Social History Narrative                   Social Determinants of Health            Financial Resource Strain: Not on file     Food Insecurity: Not on file     Transportation Needs: Not on file     Physical Activity: Not on file     Stress: Not on file     Social Connections: Not on file     Intimate Partner Violence: Not on file       Housing Stability: Not on file               Family History        Problem                                                                Allergies        Allergen                        Current Outpatient Medications       Medication                                No current facility-administered medications for this visit.

## 2023-06-07 NOTE — H&P (VIEW-ONLY)
The above histories, medications and allergies have been reviewed. ROS      Visit Vitals        BP  137/71 (BP 1 Location: Left upper arm, BP Patient Position: Sitting, BP Cuff Size: Adult)     Pulse  78     Temp  98 °F (36.7 °C) (Temporal)     Resp  16     Ht  5' 11\" (1.803 m)     Wt  201 lb (91.2 kg)     SpO2  97%        BMI        Physical Exam   Constitutional :        Appearance: Normal appearance. Cardiovascular:       Rate and Rhythm: Rhythm irregular. Pulmonary:       Breath sounds: Normal breath sounds. Abdominal:       General: There is no distension. Palpations: Abdomen is soft. There is no mass. Tenderness: There is no abdominal tenderness. Skin:      Comments: Multiple areas of ecchymosis    Neurological:       Mental Status: He is alert. 1. Hx of colonic polyps   Recommend colonoscopy. The procedure was explained in detail including the risks and benefits. Risks shared included risks of missed lesions, incomplete exam, colon injury or perforation. Risks  associated with anesthesia were also discussed. The patient wishes to proceed and we will schedule. The patient was counseled at length about the risks of berta Covid-19 during their perioperative period and any recovery window from their procedure. The patient was made aware that berta  Covid-19  may worsen their prognosis for recovering from their procedure and lend to a higher morbidity and/or mortality risk. All material risks, benefits, and reasonable alternatives including postponing the procedure were discussed. The patient does   wish to proceed with the procedure at this time. 2. PAF (paroxysmal atrial fibrillation) (HCC)   We will get the okay from Dr. Mary Aburto to hold the Eliquis. Follow-up and Dispositions       Return for post procedure.                   Mayra Corado MD

## 2023-06-08 ENCOUNTER — HOSPITAL ENCOUNTER (OUTPATIENT)
Facility: HOSPITAL | Age: 78
Setting detail: OUTPATIENT SURGERY
Discharge: HOME | End: 2023-06-08
Attending: SURGERY | Admitting: SURGERY
Payer: MEDICARE

## 2023-06-08 ENCOUNTER — ANESTHESIA (OUTPATIENT)
Facility: HOSPITAL | Age: 78
End: 2023-06-08
Payer: MEDICARE

## 2023-06-08 VITALS
HEART RATE: 85 BPM | BODY MASS INDEX: 26.74 KG/M2 | WEIGHT: 191 LBS | DIASTOLIC BLOOD PRESSURE: 68 MMHG | HEIGHT: 71 IN | SYSTOLIC BLOOD PRESSURE: 125 MMHG | RESPIRATION RATE: 17 BRPM | OXYGEN SATURATION: 97 % | TEMPERATURE: 97.8 F

## 2023-06-08 PROBLEM — Z86.0100 HISTORY OF COLON POLYPS: Status: ACTIVE | Noted: 2023-06-08

## 2023-06-08 PROBLEM — Z86.010 HISTORY OF COLON POLYPS: Status: ACTIVE | Noted: 2023-06-08

## 2023-06-08 PROCEDURE — 3600000002 HC SURGERY LEVEL 2 BASE: Performed by: SURGERY

## 2023-06-08 PROCEDURE — 7100000000 HC PACU RECOVERY - FIRST 15 MIN: Performed by: SURGERY

## 2023-06-08 PROCEDURE — 6360000002 HC RX W HCPCS: Performed by: ANESTHESIOLOGY

## 2023-06-08 PROCEDURE — 3600000012 HC SURGERY LEVEL 2 ADDTL 15MIN: Performed by: SURGERY

## 2023-06-08 PROCEDURE — 7100000001 HC PACU RECOVERY - ADDTL 15 MIN: Performed by: SURGERY

## 2023-06-08 PROCEDURE — 2709999900 HC NON-CHARGEABLE SUPPLY: Performed by: SURGERY

## 2023-06-08 PROCEDURE — 3700000000 HC ANESTHESIA ATTENDED CARE: Performed by: SURGERY

## 2023-06-08 PROCEDURE — 2580000003 HC RX 258: Performed by: SURGERY

## 2023-06-08 PROCEDURE — 3700000001 HC ADD 15 MINUTES (ANESTHESIA): Performed by: SURGERY

## 2023-06-08 RX ORDER — SODIUM CHLORIDE 0.9 % (FLUSH) 0.9 %
5-40 SYRINGE (ML) INJECTION PRN
Status: DISCONTINUED | OUTPATIENT
Start: 2023-06-08 | End: 2023-06-08 | Stop reason: HOSPADM

## 2023-06-08 RX ORDER — SODIUM CHLORIDE 9 MG/ML
INJECTION, SOLUTION INTRAVENOUS PRN
Status: DISCONTINUED | OUTPATIENT
Start: 2023-06-08 | End: 2023-06-08 | Stop reason: HOSPADM

## 2023-06-08 RX ORDER — SODIUM CHLORIDE 0.9 % (FLUSH) 0.9 %
5-40 SYRINGE (ML) INJECTION EVERY 12 HOURS SCHEDULED
Status: DISCONTINUED | OUTPATIENT
Start: 2023-06-08 | End: 2023-06-08 | Stop reason: HOSPADM

## 2023-06-08 RX ORDER — SODIUM CHLORIDE, SODIUM LACTATE, POTASSIUM CHLORIDE, CALCIUM CHLORIDE 600; 310; 30; 20 MG/100ML; MG/100ML; MG/100ML; MG/100ML
INJECTION, SOLUTION INTRAVENOUS CONTINUOUS
Status: DISCONTINUED | OUTPATIENT
Start: 2023-06-08 | End: 2023-06-08 | Stop reason: HOSPADM

## 2023-06-08 RX ORDER — MINOCYCLINE HYDROCHLORIDE 50 MG/1
CAPSULE ORAL
COMMUNITY
Start: 2023-03-15

## 2023-06-08 RX ORDER — MIDAZOLAM HYDROCHLORIDE 1 MG/ML
INJECTION INTRAMUSCULAR; INTRAVENOUS PRN
Status: DISCONTINUED | OUTPATIENT
Start: 2023-06-08 | End: 2023-06-08 | Stop reason: SDUPTHER

## 2023-06-08 RX ORDER — DILTIAZEM HYDROCHLORIDE 180 MG/1
CAPSULE, COATED, EXTENDED RELEASE ORAL
COMMUNITY
Start: 2023-05-23

## 2023-06-08 RX ORDER — PROPOFOL 10 MG/ML
INJECTION, EMULSION INTRAVENOUS CONTINUOUS PRN
Status: DISCONTINUED | OUTPATIENT
Start: 2023-06-08 | End: 2023-06-08 | Stop reason: SDUPTHER

## 2023-06-08 RX ADMIN — PROPOFOL 50 MG: 10 INJECTION, EMULSION INTRAVENOUS at 08:40

## 2023-06-08 RX ADMIN — PROPOFOL 100 MCG/KG/MIN: 10 INJECTION, EMULSION INTRAVENOUS at 08:43

## 2023-06-08 RX ADMIN — MIDAZOLAM 2 MG: 1 INJECTION INTRAMUSCULAR; INTRAVENOUS at 08:15

## 2023-06-08 RX ADMIN — SODIUM CHLORIDE, POTASSIUM CHLORIDE, SODIUM LACTATE AND CALCIUM CHLORIDE: 600; 310; 30; 20 INJECTION, SOLUTION INTRAVENOUS at 07:36

## 2023-06-08 RX ADMIN — PROPOFOL 100 MCG/KG/MIN: 10 INJECTION, EMULSION INTRAVENOUS at 08:41

## 2023-06-08 ASSESSMENT — PAIN - FUNCTIONAL ASSESSMENT: PAIN_FUNCTIONAL_ASSESSMENT: 0-10

## 2023-06-08 ASSESSMENT — PAIN SCALES - GENERAL
PAINLEVEL_OUTOF10: 0
PAINLEVEL_OUTOF10: 0

## 2023-06-08 NOTE — ANESTHESIA POSTPROCEDURE EVALUATION
Department of Anesthesiology  Postprocedure Note    Patient: Peg Bourne MRN: [id number]  YOB: 1945  Date of evaluation: 6/8/2023      Procedure Summary     Date: 06/08/23 Room / Location: [facility] MAIN OR 1 / [facility] MAIN OR    Anesthesia Start: 7078 Anesthesia Stop: 5766    Procedure: COLONOSCOPY WITH  COLD SNARE POLYPECTOMY x 2 (Lower GI Region) Diagnosis:       Personal history of colonic polyps      Paroxysmal atrial fibrillation (HCC)      (Personal history of colonic polyps [Z86.010])      ([facility] [I48.0])    Surgeons: Christie Rios MD Responsible Provider:     Anesthesia Type: MAC ASA Status: 3          Anesthesia Type: No value filed.     René Phase I:      René Phase II:        Anesthesia Post Evaluation    Patient location during evaluation: bedside  Patient participation: complete - patient participated  Level of consciousness: awake and alert  Pain score: 0  Airway patency: patent  Nausea & Vomiting: no nausea  Complications: no  Cardiovascular status: hemodynamically stable  Respiratory status: acceptable  Hydration status: stable

## 2023-06-08 NOTE — BRIEF OP NOTE
Brief Postoperative Note      Patient: Aroldo Nelson   YOB: 1945  MRN: [id number]    Date of Procedure: 6/8/2023    Pre-Op Diagnosis Codes:     * Personal history of colonic polyps [Z86.010]     * [facility] [I48.0]    Post-Op Diagnosis: Same       Procedure(s):  COLONOSCOPY WITH  COLD SNARE POLYPECTOMY x 2    Surgeon(s):  Jay Mao MD    Assistant:  * No surgical staff found *    Anesthesia: Monitor Anesthesia Care    Estimated Blood Loss (mL): Minimal    Complications: None    Specimens:   ID Type Source Tests Collected by Time Destination   A : Cecal polyps x 2 Tissue Cecum SURGICAL PATHOLOGY Jay Mao MD 6/8/2023 0087        Implants:  * No implants in log *      Drains: * No LDAs found *    Findings:   Cecal polyps x 2  Internal hemorrhoids          Electronically signed by Thuy Johns MD on 6/8/2023 at 9:07 AM

## 2023-06-08 NOTE — PROGRESS NOTES
0740: Patient into PACU due to patient in Atrial fib at a rate of around 118 bpm, CAOx4, NAD, skin warm/dry, no chest pain, no SOB, Monitor show A-fib.    0743: Patient rhythm changed to sinus rhythm, CAOx4, No pains, pt states feels fine, will continue to monitor. 0820: no change, monitor still shows sinus rhythm, no chest pain , no SOB, pt to OR for colonoscopy.

## 2023-06-08 NOTE — OP NOTE
[facility]  OPERATIVE REPORT    Name:  Jose Benson  MR#:  [id number]  :  1945  ACCOUNT #:  [de-identified]  DATE OF SERVICE:  2023    PREOPERATIVE DIAGNOSIS:  Personal history of colon polyps. POSTOPERATIVE DIAGNOSIS:  Personal history of colon polyps. PROCEDURE PERFORMED:  Colonoscopy with cold snare polypectomy x2. SURGEON:  Thuy Johns MD    ASSISTANT:  ***. ANESTHESIA:  MAC.    COMPLICATIONS:  None. SPECIMENS REMOVED:  Cecal polyps x2. IMPLANTS:  None. DRAINS:  None. ESTIMATED BLOOD LOSS:  Minimal.    FINDINGS:  1. Cecal polyps x2.  2. Internal hemorrhoids. DISPOSITION:  Stable. PROCEDURE:  The patient was brought to the operative theater. Monitoring devices and nasal cannula O2 were placed per Anesthesia and the patient was placed in the left side down decubitus position. IV sedation was administered and a time-out was performed. Digital rectal exam was performed, which was essentially normal.  A well-lubricated Olympus colonoscope was introduced in the patient's rectum and advanced to the cecum. The cecum was identified by the appendiceal orifice and the ileocecal valve. The prep was suboptimal particularly in the left colon; however, in the right colon, the transverse colon was good. The scope was carefully withdrawn with the mucosal surfaces circumferentially evaluated. We identified two small polyps in the cecum that were removed by cold snare polypectomy with specimens retrieved. No other abnormalities were noted in the cecum or ascending colon. Transverse colon appeared to be free of disease. We noticed no lesions in the descending or sigmoid colon, although again, the visualization was somewhat impaired by a moderate amount of stool. The scope was pulled back into the rectum and retroflexed, which revealed at least grade II hemorrhoids. The scope was straightened out and carefully withdrawn.     The patient tolerated the

## 2023-06-08 NOTE — ANESTHESIA PRE PROCEDURE
 Controlled type 2 diabetes mellitus without complication, without long-term current use of insulin (HCC) E11.9    Hypertension I10       Past Medical History:        Diagnosis Date    Calculus of kidney     Hypercholesterolemia     Hypertension     [facility]ox[facility]mal [facility]) ([facility] [address]     RBBB 2021       Past Surgical History:        Procedure Laterality Date    COLONOSCOPY  2018    polyp, repeat in     COLONOSCOPY      COLONOSCOPY N/A 2018    COLONOSCOPY performed by Speedy Yusuf MD at [address] [place] [address]         Social History:    Social History     Tobacco Use    Smoking status: Former     Packs/day: 1.00     Types: Cigarettes     Start date: 1972     Quit date: 2007     Years since quittin.2    Smokeless tobacco: Never   Substance Use Topics    Alcohol use: Yes                                Counseling given: Not Answered      Vital Signs (Current): There were no vitals filed for this visit.                                            BP Readings from Last 3 Encounters:   23 137/71   23 130/80   22 (!) 140/84       NPO Status:                                                                                 BMI:   Wt Readings from Last 3 Encounters:   23 91.2 kg (201 lb)   23 86.6 kg (191 lb)   22 88 kg (194 lb)     There is no height or weight on file to calculate BMI.    CBC:   Lab Results   Component Value Date/Time    WBC 8.5 2019 11:02 AM    RBC 5.17 2019 11:02 AM    HGB 17.1 2019 11:02 AM    HCT 49.9 2019 11:02 AM    MCV 96.5 2019 11:02 AM    RDW 13.3 2019 11:02 AM     2019 11:02 AM       CMP:   Lab Results   Component Value Date/Time     2021 09:07 AM    K 4.3 2021 09:07 AM     2021 09:07 AM    CO2 28 2021 09:07 AM    BUN 21 2021 09:07 AM    CREATININE 1.22 2021 09:07 AM    GFRAA >60 2021 09:07 AM
(+) DiabetesType II DM, , .                 Abdominal:             Vascular: Other Findings:           Anesthesia Plan      MAC     ASA 3             Anesthetic plan and risks discussed with patient.                         Corinna Anderson MD   6/8/2023

## 2023-06-08 NOTE — INTERVAL H&P NOTE
Update History & Physical    The patient's History and Physical of June 7, 2023 was reviewed with the patient and I examined the patient. There was no change. The surgical site was confirmed by the patient and me. Currently in A-fib. Evaluated by anesthesia. Plan: The risks, benefits, expected outcome, and alternative to the recommended procedure have been discussed with the patient. Patient understands and wants to proceed with the procedure.      Electronically signed by Leesa Christianson MD on 6/8/2023 at 7:20 AM

## 2023-06-19 ENCOUNTER — OFFICE VISIT (OUTPATIENT)
Age: 78
End: 2023-06-19
Payer: MEDICARE

## 2023-06-19 VITALS
RESPIRATION RATE: 16 BRPM | TEMPERATURE: 98.6 F | OXYGEN SATURATION: 96 % | WEIGHT: 193 LBS | HEIGHT: 71 IN | DIASTOLIC BLOOD PRESSURE: 60 MMHG | BODY MASS INDEX: 27.02 KG/M2 | SYSTOLIC BLOOD PRESSURE: 150 MMHG | HEART RATE: 100 BPM

## 2023-06-19 DIAGNOSIS — Z86.010 PERSONAL HISTORY OF COLONIC POLYPS: Primary | ICD-10-CM

## 2023-06-19 PROCEDURE — 3078F DIAST BP <80 MM HG: CPT | Performed by: SURGERY

## 2023-06-19 PROCEDURE — G8427 DOCREV CUR MEDS BY ELIG CLIN: HCPCS | Performed by: SURGERY

## 2023-06-19 PROCEDURE — 1123F ACP DISCUSS/DSCN MKR DOCD: CPT | Performed by: SURGERY

## 2023-06-19 PROCEDURE — 1036F TOBACCO NON-USER: CPT | Performed by: SURGERY

## 2023-06-19 PROCEDURE — 3077F SYST BP >= 140 MM HG: CPT | Performed by: SURGERY

## 2023-06-19 PROCEDURE — G8419 CALC BMI OUT NRM PARAM NOF/U: HCPCS | Performed by: SURGERY

## 2023-06-19 PROCEDURE — 99213 OFFICE O/P EST LOW 20 MIN: CPT | Performed by: SURGERY

## 2023-06-19 ASSESSMENT — PATIENT HEALTH QUESTIONNAIRE - PHQ9
1. LITTLE INTEREST OR PLEASURE IN DOING THINGS: 0
SUM OF ALL RESPONSES TO PHQ9 QUESTIONS 1 & 2: 0
SUM OF ALL RESPONSES TO PHQ QUESTIONS 1-9: 0
2. FEELING DOWN, DEPRESSED OR HOPELESS: 0
SUM OF ALL RESPONSES TO PHQ QUESTIONS 1-9: 0

## 2023-06-19 NOTE — PROGRESS NOTES
Identified pt with two pt identifiers (name and ). Reviewed chart in preparation for visit and have obtained necessary documentation. Haydee Cooper is a 66 y.o. male Post-Op Check (Colonoscopy result)  . There were no vitals filed for this visit. 1. Have you been to the ER, urgent care clinic since your last visit? Hospitalized since your last visit?  no     2. Have you seen or consulted any other health care providers outside of the [address] [place] [address] since your last visit? Include any pap smears or colon screening. yes - [facility]    BP elevated. Patient advised to  follow up with PCP and check BP twice a day at home.

## 2023-06-19 NOTE — PROGRESS NOTES
[address] [facility] Surgery      Clinic Note - Follow up    Subjective     Betsy Ellis. returns for scheduled follow up today. He is status post colonoscopy that was performed back on June 8. He has done well since the procedure. He has had no new problems. He was found to have 2 adenomatous polyps in the cecum as well as internal hemorrhoids. Objective     BP (!) 150/60   Pulse 100   Temp 98.6 °F (37 °C) (Temporal)   Resp 16   Ht 5' 11\" (1.803 m)   Wt 193 lb (87.5 kg)   SpO2 96%   BMI 26.92 kg/m²       PE  GEN - Awake, alert, communicating appropriately. NAD    Assessment     Betsy Turner is a 66 y. o.yr old male status post colonoscopy with findings of 2 adenomatous polyps of the cecum and internal hemorrhoids. He has been doing well. Plan     Recommend consideration for repeat colonoscopy in 3 years if he remains in good health.     Lloyd Conteh MD    CC: Dr. Donovan Sawyer

## 2023-09-07 PROBLEM — Z79.01 CHRONIC ANTICOAGULATION: Status: ACTIVE | Noted: 2023-09-07

## 2023-09-12 ENCOUNTER — OFFICE VISIT (OUTPATIENT)
Age: 78
End: 2023-09-12
Payer: MEDICARE

## 2023-09-12 VITALS
SYSTOLIC BLOOD PRESSURE: 132 MMHG | OXYGEN SATURATION: 95 % | HEIGHT: 71 IN | WEIGHT: 195 LBS | RESPIRATION RATE: 20 BRPM | TEMPERATURE: 97.7 F | HEART RATE: 78 BPM | BODY MASS INDEX: 27.3 KG/M2 | DIASTOLIC BLOOD PRESSURE: 86 MMHG

## 2023-09-12 DIAGNOSIS — I10 ESSENTIAL HYPERTENSION: ICD-10-CM

## 2023-09-12 DIAGNOSIS — I45.10 RBBB: ICD-10-CM

## 2023-09-12 DIAGNOSIS — I48.0 PAF (PAROXYSMAL ATRIAL FIBRILLATION) (HCC): Primary | ICD-10-CM

## 2023-09-12 DIAGNOSIS — Z79.01 CHRONIC ANTICOAGULATION: ICD-10-CM

## 2023-09-12 PROCEDURE — 1123F ACP DISCUSS/DSCN MKR DOCD: CPT | Performed by: INTERNAL MEDICINE

## 2023-09-12 PROCEDURE — 3079F DIAST BP 80-89 MM HG: CPT | Performed by: INTERNAL MEDICINE

## 2023-09-12 PROCEDURE — 3075F SYST BP GE 130 - 139MM HG: CPT | Performed by: INTERNAL MEDICINE

## 2023-09-12 PROCEDURE — 99214 OFFICE O/P EST MOD 30 MIN: CPT | Performed by: INTERNAL MEDICINE

## 2024-06-13 NOTE — PROGRESS NOTES
ASSESSMENT and PLAN  1. PAF (paroxysmal atrial fibrillation) ([facility])  Currently in rate-controlled atrial fibrillation of uncertain chronicity.  Asymptomatic.  We discussed treatment options including rate-control and rhythm-control strategies.  He preferred to pursue a rate-control strategy.  Continue metoprolol and diltiazem as prescribed.  Continue OAC for stroke prophylaxis.  Update echocardiogram.    2. Chronic anticoagulation  No bleeding complications reported on Eliquis.  He previously declined referral for Watchman evaluation.    3. RBBB  Chronic.  Continue to monitor.    4. Essential hypertension  Well-controlled.  No changes recommended.       Follow-up in 6 months.  We will contact him in the interim with results of his echo.  The patient has been instructed and agrees to call our office with any issues or other concerns related to their cardiac condition(s) and/or complaint(s).      CHIEF COMPLAINT  Routine follow-up    HPI:    Nolan Blackmon Jr. is a 79 y.o. male here for follow-up of paroxysmal atrial fibrillation and chronic anticoagulation.  No cardiac issues have developed since the last visit 9/12/2023. He denies chest pain, dyspnea, heart failure symptoms, syncope or near syncope.  Denies palpitations, heart racing or other symptoms to suggest recurrence of atrial fibrillation.  He denies bleeding complications related to Eliquis.  He has not noticed any changes in exercise tolerance.  He denies stroke or TIA symptoms.  He monitors his blood pressure and heart rate at home several times a week.  His heart rates have been in the 70s and 80s.  He is ECG today demonstrates atrial fibrillation but he cannot tell that he is in it.    PERTINENT CARDIAC HISTORY: (Records reviewed and summarized below)  Atrial fibrillation, paroxysmal  ==> [facility] 2/11/2020, EF 83%, no ischemia or infarction  ==> Echo 2/18/2020, EF 70%, valves nl, RV nl, PASP

## 2024-06-25 ENCOUNTER — OFFICE VISIT (OUTPATIENT)
Age: 79
End: 2024-06-25
Payer: MEDICARE

## 2024-06-25 VITALS
WEIGHT: 187 LBS | DIASTOLIC BLOOD PRESSURE: 80 MMHG | RESPIRATION RATE: 20 BRPM | OXYGEN SATURATION: 99 % | BODY MASS INDEX: 26.18 KG/M2 | HEART RATE: 87 BPM | SYSTOLIC BLOOD PRESSURE: 144 MMHG | HEIGHT: 71 IN | TEMPERATURE: 98.2 F

## 2024-06-25 DIAGNOSIS — Z79.01 CHRONIC ANTICOAGULATION: ICD-10-CM

## 2024-06-25 DIAGNOSIS — I48.0 PAF (PAROXYSMAL ATRIAL FIBRILLATION) (HCC): Primary | ICD-10-CM

## 2024-06-25 DIAGNOSIS — I10 ESSENTIAL HYPERTENSION: ICD-10-CM

## 2024-06-25 DIAGNOSIS — I45.10 RBBB: ICD-10-CM

## 2024-06-25 DIAGNOSIS — I48.91 ATRIAL FIBRILLATION, UNSPECIFIED TYPE (HCC): ICD-10-CM

## 2024-06-25 PROCEDURE — 99214 OFFICE O/P EST MOD 30 MIN: CPT | Performed by: INTERNAL MEDICINE

## 2024-06-25 PROCEDURE — 1123F ACP DISCUSS/DSCN MKR DOCD: CPT | Performed by: INTERNAL MEDICINE

## 2024-06-25 PROCEDURE — 1036F TOBACCO NON-USER: CPT | Performed by: INTERNAL MEDICINE

## 2024-06-25 PROCEDURE — 3077F SYST BP >= 140 MM HG: CPT | Performed by: INTERNAL MEDICINE

## 2024-06-25 PROCEDURE — G8428 CUR MEDS NOT DOCUMENT: HCPCS | Performed by: INTERNAL MEDICINE

## 2024-06-25 PROCEDURE — G8419 CALC BMI OUT NRM PARAM NOF/U: HCPCS | Performed by: INTERNAL MEDICINE

## 2024-06-25 PROCEDURE — 3079F DIAST BP 80-89 MM HG: CPT | Performed by: INTERNAL MEDICINE

## 2024-06-25 PROCEDURE — 93000 ELECTROCARDIOGRAM COMPLETE: CPT | Performed by: INTERNAL MEDICINE

## 2024-06-25 RX ORDER — METOPROLOL SUCCINATE 100 MG/1
100 TABLET, EXTENDED RELEASE ORAL DAILY
COMMUNITY
Start: 2024-04-30

## 2024-06-25 ASSESSMENT — PATIENT HEALTH QUESTIONNAIRE - PHQ9
2. FEELING DOWN, DEPRESSED OR HOPELESS: NOT AT ALL
SUM OF ALL RESPONSES TO PHQ QUESTIONS 1-9: 0
SUM OF ALL RESPONSES TO PHQ9 QUESTIONS 1 & 2: 0
SUM OF ALL RESPONSES TO PHQ QUESTIONS 1-9: 0
1. LITTLE INTEREST OR PLEASURE IN DOING THINGS: NOT AT ALL
SUM OF ALL RESPONSES TO PHQ QUESTIONS 1-9: 0
SUM OF ALL RESPONSES TO PHQ QUESTIONS 1-9: 0

## 2024-06-25 NOTE — PROGRESS NOTES
Identified pt with two pt identifiers(name and ). Reviewed record in preparation for visit and have obtained necessary documentation.  Chief Complaint   Patient presents with    Atrial Fibrillation    Other     RBBB    Hypertension    Cholesterol Problem      BP (!) 144/80 (Site: Left Upper Arm, Position: Sitting, Cuff Size: Medium Adult)   Pulse 87   Temp 98.2 °F (36.8 °C) (Temporal)   Resp 20   Ht 1.803 m (5' 11\")   Wt 84.8 kg (187 lb)   SpO2 99%   BMI 26.08 kg/m²       Medications reviewed/approved by provider.      Health Maintenance Review: Patient reminded of \"due or due soon\" health maintenance. I have asked the patient to contact his/her primary care provider (PCP) for follow-up on his/her health maintenance.    Coordination of Care Questionnaire:  :   1) Have you been to an emergency room, urgent care, or hospitalized since your last visit?  If yes, where when, and reason for visit? no       2. Have seen or consulted any other health care provider since your last visit?   If yes, where when, and reason for visit?  no      Patient is accompanied by self I have received verbal consent from Nolan Blackmon Jr. to discuss any/all medical information while they are present in the room.     5-Fu Counseling: 5-Fluorouracil Counseling:  I discussed with the patient the risks of 5-fluorouracil including but not limited to erythema, scaling, itching, weeping, crusting, and pain.

## 2024-07-10 ENCOUNTER — HOSPITAL ENCOUNTER (OUTPATIENT)
Facility: HOSPITAL | Age: 79
Discharge: HOME | End: 2024-07-13
Attending: INTERNAL MEDICINE
Payer: MEDICARE

## 2024-07-10 DIAGNOSIS — I48.0 PAF (PAROXYSMAL ATRIAL FIBRILLATION) (HCC): ICD-10-CM

## 2024-07-10 LAB
ECHO AO ASC DIAM: 3.7 CM
ECHO AO ROOT DIAM: 3 CM
ECHO AV PEAK GRADIENT: 4 MMHG
ECHO AV PEAK VELOCITY: 1 M/S
ECHO EST RA PRESSURE: 3 MMHG
ECHO LA DIAMETER: 3.3 CM
ECHO LA TO AORTIC ROOT RATIO: 1.1
ECHO LA VOL A-L A2C: 65 ML (ref 18–58)
ECHO LA VOL A-L A4C: 87 ML (ref 18–58)
ECHO LA VOL MOD A2C: 54 ML (ref 18–58)
ECHO LA VOL MOD A4C: 80 ML (ref 18–58)
ECHO LA VOLUME AREA LENGTH: 85 ML
ECHO LV FRACTIONAL SHORTENING: 36 % (ref 28–44)
ECHO LV INTERNAL DIMENSION DIASTOLIC: 4.4 CM (ref 4.2–5.9)
ECHO LV INTERNAL DIMENSION SYSTOLIC: 2.8 CM
ECHO LV IVSD: 0.6 CM (ref 0.6–1)
ECHO LV MASS 2D: 100.6 G (ref 88–224)
ECHO LV POSTERIOR WALL DIASTOLIC: 0.9 CM (ref 0.6–1)
ECHO LV RELATIVE WALL THICKNESS RATIO: 0.41
ECHO LVOT AREA: 3.1 CM2
ECHO LVOT DIAM: 2 CM
ECHO PULMONARY ARTERY SYSTOLIC PRESSURE (PASP): 44 MMHG
ECHO RA AREA 4C: 39.2 CM2
ECHO RIGHT VENTRICULAR SYSTOLIC PRESSURE (RVSP): 41 MMHG
ECHO RV BASAL DIMENSION: 5.9 CM
ECHO RV TAPSE: 1.7 CM (ref 1.7–?)
ECHO TV REGURGITANT MAX VELOCITY: 3.08 M/S
ECHO TV REGURGITANT PEAK GRADIENT: 38 MMHG

## 2024-07-10 PROCEDURE — 93306 TTE W/DOPPLER COMPLETE: CPT | Performed by: INTERNAL MEDICINE

## 2024-07-10 PROCEDURE — 93306 TTE W/DOPPLER COMPLETE: CPT

## 2024-07-10 NOTE — RESULT ENCOUNTER NOTE
The echocardiogram demonstrates normal heart function and no significant abnormalities.  There were no abnormal findings of concern.

## 2024-07-11 NOTE — RESULT ENCOUNTER NOTE
Verified Patient with two identifiers.    Patient voiced understanding and all questions were answered.

## 2024-10-16 ENCOUNTER — TRANSCRIBE ORDERS (OUTPATIENT)
Facility: HOSPITAL | Age: 79
End: 2024-10-16

## 2024-10-16 DIAGNOSIS — R91.1 SOLITARY PULMONARY NODULE: Primary | ICD-10-CM

## 2024-10-22 ENCOUNTER — HOSPITAL ENCOUNTER (OUTPATIENT)
Facility: HOSPITAL | Age: 79
Discharge: HOME | End: 2024-10-25
Payer: MEDICARE

## 2024-10-22 DIAGNOSIS — R91.1 SOLITARY PULMONARY NODULE: ICD-10-CM

## 2024-10-22 LAB
BUN SERPL-MCNC: 25 MG/DL (ref 6–20)
CREAT SERPL-MCNC: 1.18 MG/DL (ref 0.7–1.3)

## 2024-10-22 PROCEDURE — 82565 ASSAY OF CREATININE: CPT

## 2024-10-22 PROCEDURE — 71260 CT THORAX DX C+: CPT

## 2024-10-22 PROCEDURE — 36415 COLL VENOUS BLD VENIPUNCTURE: CPT

## 2024-10-22 PROCEDURE — 84520 ASSAY OF UREA NITROGEN: CPT

## 2024-10-22 PROCEDURE — 6360000004 HC RX CONTRAST MEDICATION: Performed by: INTERNAL MEDICINE

## 2024-10-22 RX ORDER — IOPAMIDOL 755 MG/ML
100 INJECTION, SOLUTION INTRAVASCULAR ONCE
Status: COMPLETED | OUTPATIENT
Start: 2024-10-22 | End: 2024-10-22

## 2024-10-22 RX ADMIN — IOPAMIDOL 80 ML: 755 INJECTION, SOLUTION INTRAVENOUS at 13:49

## 2025-01-22 PROBLEM — I48.19 PERSISTENT ATRIAL FIBRILLATION (HCC): Status: ACTIVE | Noted: 2025-01-22

## 2025-01-22 NOTE — PROGRESS NOTES
ASSESSMENT and PLAN  1.  Persistent atrial fibrillation (HCC)  Asymptomatic.  Managed with a rate-control strategy by patient choice.  Adequately rate-controlled on the current doses of metoprolol succinate and diltiazem.  Continue OAC for stroke prophylaxis.    2. Chronic anticoagulation  No bleeding complications reported on Eliquis.  He previously declined referral for Watchman evaluation.    3. 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  No signs or symptoms of high-grade AV block.  Continue to monitor.    4.  Primary hypertension  Well-controlled.  No changes recommended.    5.  Preop cardiovascular exam  From a cardiac standpoint, he can proceed with prostate biopsy as scheduled.  I would consider him to be low risk for perioperative cardiovascular complications for the procedure planned.  The Eliquis may be interrupted 2-3 days prior to the procedure with the shorter and or longer end of the duration depending on surgical bleeding risk as determined by the surgeon.  Restart Eliquis as soon as possible after the procedure when deemed safe to do so from Dr. Contreras's standpoint.       Follow-up in 6 months. The patient has been instructed and agrees to call our office with any issues or other concerns related to their cardiac condition(s) and/or complaint(s).      CHIEF COMPLAINT  Routine follow-up    HPI:    Nolan Blackmon Jr. is a 79 y.o. male here for follow-up of atrial fibrillation and chronic anticoagulation.  At the last visit on 6/25/2024, he was in new rate-controlled atrial fibrillation but was asymptomatic and unaware.  We discussed rate-control and rhythm-control treatment options and he preferred to pursue a rate-control strategy.  He was well rate-controlled on metoprolol and diltiazem.  An echo was updated on 7/10/2024 demonstrating EF 65% and no significant valve abnormalities.     He returns today for follow-up.  He denies chest pain, dyspnea, heart

## 2025-01-30 ENCOUNTER — OFFICE VISIT (OUTPATIENT)
Age: 80
End: 2025-01-30
Payer: MEDICARE

## 2025-01-30 VITALS
HEIGHT: 71 IN | OXYGEN SATURATION: 97 % | SYSTOLIC BLOOD PRESSURE: 130 MMHG | HEART RATE: 82 BPM | RESPIRATION RATE: 18 BRPM | BODY MASS INDEX: 26.04 KG/M2 | TEMPERATURE: 98.7 F | WEIGHT: 186 LBS | DIASTOLIC BLOOD PRESSURE: 84 MMHG

## 2025-01-30 DIAGNOSIS — I45.2 RBBB (RIGHT BUNDLE BRANCH BLOCK) WITH LEFT POSTERIOR FASCICULAR BLOCK: ICD-10-CM

## 2025-01-30 DIAGNOSIS — Z79.01 CHRONIC ANTICOAGULATION: ICD-10-CM

## 2025-01-30 DIAGNOSIS — I48.19 PERSISTENT ATRIAL FIBRILLATION (HCC): Primary | ICD-10-CM

## 2025-01-30 DIAGNOSIS — I10 PRIMARY HYPERTENSION: ICD-10-CM

## 2025-01-30 DIAGNOSIS — Z01.810 PREOP CARDIOVASCULAR EXAM: ICD-10-CM

## 2025-01-30 PROBLEM — I45.10 RBBB: Status: RESOLVED | Noted: 2021-05-21 | Resolved: 2025-01-30

## 2025-01-30 PROCEDURE — 99214 OFFICE O/P EST MOD 30 MIN: CPT | Performed by: INTERNAL MEDICINE

## 2025-01-30 PROCEDURE — G8419 CALC BMI OUT NRM PARAM NOF/U: HCPCS | Performed by: INTERNAL MEDICINE

## 2025-01-30 PROCEDURE — 1126F AMNT PAIN NOTED NONE PRSNT: CPT | Performed by: INTERNAL MEDICINE

## 2025-01-30 PROCEDURE — 3075F SYST BP GE 130 - 139MM HG: CPT | Performed by: INTERNAL MEDICINE

## 2025-01-30 PROCEDURE — 93000 ELECTROCARDIOGRAM COMPLETE: CPT | Performed by: INTERNAL MEDICINE

## 2025-01-30 PROCEDURE — 1123F ACP DISCUSS/DSCN MKR DOCD: CPT | Performed by: INTERNAL MEDICINE

## 2025-01-30 PROCEDURE — G8428 CUR MEDS NOT DOCUMENT: HCPCS | Performed by: INTERNAL MEDICINE

## 2025-01-30 PROCEDURE — 3079F DIAST BP 80-89 MM HG: CPT | Performed by: INTERNAL MEDICINE

## 2025-01-30 PROCEDURE — 1036F TOBACCO NON-USER: CPT | Performed by: INTERNAL MEDICINE

## 2025-01-30 ASSESSMENT — PATIENT HEALTH QUESTIONNAIRE - PHQ9
SUM OF ALL RESPONSES TO PHQ QUESTIONS 1-9: 0
2. FEELING DOWN, DEPRESSED OR HOPELESS: NOT AT ALL
SUM OF ALL RESPONSES TO PHQ QUESTIONS 1-9: 0
SUM OF ALL RESPONSES TO PHQ QUESTIONS 1-9: 0
SUM OF ALL RESPONSES TO PHQ9 QUESTIONS 1 & 2: 0
1. LITTLE INTEREST OR PLEASURE IN DOING THINGS: NOT AT ALL
SUM OF ALL RESPONSES TO PHQ QUESTIONS 1-9: 0

## 2025-01-30 NOTE — PROGRESS NOTES
Identified pt with two pt identifiers(name and ). Reviewed record in preparation for visit and have obtained necessary documentation.  Chief Complaint   Patient presents with    Other     RBBB    Atrial Fibrillation    Cholesterol Problem    Hypertension    Cardiac Clearance      /84 (Site: Left Upper Arm, Position: Sitting, Cuff Size: Medium Adult)   Pulse 82   Temp 98.7 °F (37.1 °C) (Temporal)   Resp 18   Ht 1.803 m (5' 11\")   Wt 84.4 kg (186 lb)   SpO2 97%   BMI 25.94 kg/m²       Medications reviewed/approved by provider.      Health Maintenance Review: Patient reminded of \"due or due soon\" health maintenance. I have asked the patient to contact his/her primary care provider (PCP) for follow-up on his/her health maintenance.    Coordination of Care Questionnaire:  :   1) Have you been to an emergency room, urgent care, or hospitalized since your last visit?  If yes, where when, and reason for visit? no       2. Have seen or consulted any other health care provider since your last visit?   If yes, where when, and reason for visit?  yes - [facility]      Patient is accompanied by self I have received verbal consent from Nolan Blackmon Jr. to discuss any/all medical information while they are present in the room.

## (undated) DEVICE — GOWN,ISO,KNITCUFF, PREMIUM BLUE, LV3,XL: Brand: MEDLINE INDUSTRIES, INC.

## (undated) DEVICE — SOLUTION IRRIG 1000ML STRL H2O USP PLAS POUR BTL

## (undated) DEVICE — TRAP POLYP 1 CHMBR WIDE EYE

## (undated) DEVICE — SNARE ENDOSCP L240CM LOOP W27MM SHTH DIA2.4MM WRK CHN 2.8MM

## (undated) DEVICE — BLUNT CANNULA: Brand: MONOJECT

## (undated) DEVICE — FORCEPS BX L240CM JAW DIA2.2MM RAD JAW 4 HOT DISP

## (undated) DEVICE — KIT ENDO OP4 CA 1.1+ BX20

## (undated) DEVICE — LINER,SEMI-RIGID,3000CC,50EA/CS: Brand: MEDLINE

## (undated) DEVICE — SYRINGE 20ML LL S/C 50